# Patient Record
Sex: FEMALE | Race: WHITE | NOT HISPANIC OR LATINO | Employment: FULL TIME | URBAN - NONMETROPOLITAN AREA
[De-identification: names, ages, dates, MRNs, and addresses within clinical notes are randomized per-mention and may not be internally consistent; named-entity substitution may affect disease eponyms.]

---
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
## 2018-04-26 LAB
EXTERNAL GBS SUSCEPTIBILITY TO CLINDAMYCIN: NO
EXTERNAL GROUP B STREP ANTIGEN: POSITIVE

## 2018-05-13 ENCOUNTER — HOSPITAL ENCOUNTER (OUTPATIENT)
Facility: HOSPITAL | Age: 18
Setting detail: OBSERVATION
Discharge: HOME | End: 2018-05-14
Attending: OBSTETRICS & GYNECOLOGY | Admitting: OBSTETRICS & GYNECOLOGY

## 2018-05-13 PROBLEM — O47.9 THREATENED LABOR: Status: ACTIVE | Noted: 2018-05-13

## 2018-05-13 LAB
DEPRECATED RDW RBC AUTO: 43.4 FL (ref 40–54)
ERYTHROCYTE [DISTWIDTH] IN BLOOD BY AUTOMATED COUNT: 12.9 % (ref 12–15)
HCT VFR BLD AUTO: 34.8 % (ref 37–47)
HGB BLD-MCNC: 12.4 G/DL (ref 12–16)
MCH RBC QN AUTO: 32.9 PG (ref 28–32)
MCHC RBC AUTO-ENTMCNC: 35.6 G/DL (ref 33–36)
MCV RBC AUTO: 92.3 FL (ref 82–98)
PLATELET # BLD AUTO: 172 10*3/MM3 (ref 130–400)
PMV BLD AUTO: 12.8 FL (ref 6–12)
RBC # BLD AUTO: 3.77 10*6/MM3 (ref 4.2–5.4)
WBC NRBC COR # BLD: 11.24 10*3/MM3 (ref 4.8–10.8)

## 2018-05-13 PROCEDURE — 85027 COMPLETE CBC AUTOMATED: CPT | Performed by: OBSTETRICS & GYNECOLOGY

## 2018-05-13 PROCEDURE — 86900 BLOOD TYPING SEROLOGIC ABO: CPT | Performed by: OBSTETRICS & GYNECOLOGY

## 2018-05-13 PROCEDURE — G0378 HOSPITAL OBSERVATION PER HR: HCPCS

## 2018-05-13 PROCEDURE — 86850 RBC ANTIBODY SCREEN: CPT | Performed by: OBSTETRICS & GYNECOLOGY

## 2018-05-13 PROCEDURE — 86901 BLOOD TYPING SEROLOGIC RH(D): CPT | Performed by: OBSTETRICS & GYNECOLOGY

## 2018-05-13 RX ORDER — PRENATAL VIT NO.126/IRON/FOLIC 28MG-0.8MG
1 TABLET ORAL DAILY
COMMUNITY

## 2018-05-13 RX ORDER — SODIUM CHLORIDE, SODIUM LACTATE, POTASSIUM CHLORIDE, CALCIUM CHLORIDE 600; 310; 30; 20 MG/100ML; MG/100ML; MG/100ML; MG/100ML
125 INJECTION, SOLUTION INTRAVENOUS CONTINUOUS
Status: DISCONTINUED | OUTPATIENT
Start: 2018-05-13 | End: 2018-05-14 | Stop reason: HOSPADM

## 2018-05-13 RX ORDER — SODIUM CHLORIDE 0.9 % (FLUSH) 0.9 %
1-10 SYRINGE (ML) INJECTION AS NEEDED
Status: DISCONTINUED | OUTPATIENT
Start: 2018-05-13 | End: 2018-05-14 | Stop reason: HOSPADM

## 2018-05-13 RX ADMIN — SODIUM CHLORIDE, POTASSIUM CHLORIDE, SODIUM LACTATE AND CALCIUM CHLORIDE 1000 ML: 600; 310; 30; 20 INJECTION, SOLUTION INTRAVENOUS at 23:05

## 2018-05-14 VITALS
TEMPERATURE: 97.6 F | HEART RATE: 70 BPM | WEIGHT: 167.4 LBS | OXYGEN SATURATION: 99 % | HEIGHT: 66 IN | DIASTOLIC BLOOD PRESSURE: 83 MMHG | RESPIRATION RATE: 18 BRPM | BODY MASS INDEX: 26.9 KG/M2 | SYSTOLIC BLOOD PRESSURE: 139 MMHG

## 2018-05-14 LAB
ABO GROUP BLD: NORMAL
BLD GP AB SCN SERPL QL: NEGATIVE
RH BLD: POSITIVE
T&S EXPIRATION DATE: NORMAL

## 2018-05-14 PROCEDURE — G0378 HOSPITAL OBSERVATION PER HR: HCPCS

## 2018-05-14 PROCEDURE — G0463 HOSPITAL OUTPT CLINIC VISIT: HCPCS

## 2018-05-14 PROCEDURE — 96361 HYDRATE IV INFUSION ADD-ON: CPT

## 2018-05-14 PROCEDURE — 96360 HYDRATION IV INFUSION INIT: CPT

## 2018-05-14 RX ADMIN — SODIUM CHLORIDE, POTASSIUM CHLORIDE, SODIUM LACTATE AND CALCIUM CHLORIDE 125 ML/HR: 600; 310; 30; 20 INJECTION, SOLUTION INTRAVENOUS at 01:01

## 2018-05-15 ENCOUNTER — HOSPITAL ENCOUNTER (INPATIENT)
Facility: HOSPITAL | Age: 18
LOS: 2 days | Discharge: HOME | End: 2018-05-17
Attending: OBSTETRICS & GYNECOLOGY | Admitting: OBSTETRICS & GYNECOLOGY

## 2018-05-15 ENCOUNTER — ANESTHESIA EVENT (OUTPATIENT)
Dept: LABOR AND DELIVERY | Facility: HOSPITAL | Age: 18
End: 2018-05-15

## 2018-05-15 ENCOUNTER — ANESTHESIA (OUTPATIENT)
Dept: LABOR AND DELIVERY | Facility: HOSPITAL | Age: 18
End: 2018-05-15

## 2018-05-15 PROBLEM — Z37.9 NORMAL LABOR: Status: ACTIVE | Noted: 2018-05-15

## 2018-05-15 PROBLEM — Z37.9 NORMAL LABOR: Status: RESOLVED | Noted: 2018-05-15 | Resolved: 2018-05-15

## 2018-05-15 PROBLEM — O47.9 THREATENED LABOR: Status: RESOLVED | Noted: 2018-05-13 | Resolved: 2018-05-15

## 2018-05-15 LAB
AMPHET+METHAMPHET UR QL: NEGATIVE
BARBITURATES UR QL SCN: NEGATIVE
BENZODIAZ UR QL SCN: NEGATIVE
CANNABINOIDS SERPL QL: NEGATIVE
COCAINE UR QL: NEGATIVE
DEPRECATED RDW RBC AUTO: 42.4 FL (ref 40–54)
EOSINOPHIL # BLD MANUAL: 0.27 10*3/MM3 (ref 0–0.7)
EOSINOPHIL NFR BLD MANUAL: 2 % (ref 0–4)
ERYTHROCYTE [DISTWIDTH] IN BLOOD BY AUTOMATED COUNT: 13 % (ref 12–15)
HCT VFR BLD AUTO: 39.1 % (ref 37–47)
HGB BLD-MCNC: 13.8 G/DL (ref 12–16)
LYMPHOCYTES # BLD MANUAL: 3.73 10*3/MM3 (ref 0.72–4.86)
LYMPHOCYTES NFR BLD MANUAL: 28 % (ref 15–45)
LYMPHOCYTES NFR BLD MANUAL: 9 % (ref 4–12)
MCH RBC QN AUTO: 32.3 PG (ref 28–32)
MCHC RBC AUTO-ENTMCNC: 35.3 G/DL (ref 33–36)
MCV RBC AUTO: 91.6 FL (ref 82–98)
METHADONE UR QL SCN: NEGATIVE
MONOCYTES # BLD AUTO: 1.2 10*3/MM3 (ref 0.19–1.3)
NEUTROPHILS # BLD AUTO: 8.12 10*3/MM3 (ref 1.87–8.4)
NEUTROPHILS NFR BLD MANUAL: 61 % (ref 39–78)
OPIATES UR QL: NEGATIVE
PCP UR QL SCN: NEGATIVE
PLATELET # BLD AUTO: 213 10*3/MM3 (ref 130–400)
PMV BLD AUTO: 12.8 FL (ref 6–12)
POIKILOCYTOSIS BLD QL SMEAR: NORMAL
RBC # BLD AUTO: 4.27 10*6/MM3 (ref 4.2–5.4)
SMALL PLATELETS BLD QL SMEAR: NORMAL
WBC MORPH BLD: NORMAL
WBC NRBC COR # BLD: 13.31 10*3/MM3 (ref 4.8–10.8)

## 2018-05-15 PROCEDURE — 80307 DRUG TEST PRSMV CHEM ANLYZR: CPT | Performed by: OBSTETRICS & GYNECOLOGY

## 2018-05-15 PROCEDURE — 25010000002 PENICILLIN G POTASSIUM PER 600000 UNITS: Performed by: OBSTETRICS & GYNECOLOGY

## 2018-05-15 PROCEDURE — 36415 COLL VENOUS BLD VENIPUNCTURE: CPT | Performed by: OBSTETRICS & GYNECOLOGY

## 2018-05-15 PROCEDURE — 25010000002 TERBUTALINE PER 1 MG: Performed by: OBSTETRICS & GYNECOLOGY

## 2018-05-15 PROCEDURE — 85027 COMPLETE CBC AUTOMATED: CPT | Performed by: OBSTETRICS & GYNECOLOGY

## 2018-05-15 PROCEDURE — 51702 INSERT TEMP BLADDER CATH: CPT

## 2018-05-15 PROCEDURE — 88307 TISSUE EXAM BY PATHOLOGIST: CPT | Performed by: OBSTETRICS & GYNECOLOGY

## 2018-05-15 RX ORDER — SODIUM CHLORIDE 0.9 % (FLUSH) 0.9 %
1-10 SYRINGE (ML) INJECTION AS NEEDED
Status: DISCONTINUED | OUTPATIENT
Start: 2018-05-15 | End: 2018-05-17 | Stop reason: HOSPADM

## 2018-05-15 RX ORDER — MISOPROSTOL 200 UG/1
600 TABLET ORAL ONCE AS NEEDED
Status: DISCONTINUED | OUTPATIENT
Start: 2018-05-15 | End: 2018-05-17 | Stop reason: HOSPADM

## 2018-05-15 RX ORDER — IBUPROFEN 800 MG/1
800 TABLET ORAL EVERY 8 HOURS PRN
Status: DISCONTINUED | OUTPATIENT
Start: 2018-05-15 | End: 2018-05-15 | Stop reason: HOSPADM

## 2018-05-15 RX ORDER — BUTORPHANOL TARTRATE 1 MG/ML
1 INJECTION, SOLUTION INTRAMUSCULAR; INTRAVENOUS
Status: DISCONTINUED | OUTPATIENT
Start: 2018-05-15 | End: 2018-05-15 | Stop reason: HOSPADM

## 2018-05-15 RX ORDER — PROMETHAZINE HYDROCHLORIDE 25 MG/ML
12.5 INJECTION, SOLUTION INTRAMUSCULAR; INTRAVENOUS EVERY 6 HOURS PRN
Status: DISCONTINUED | OUTPATIENT
Start: 2018-05-15 | End: 2018-05-15 | Stop reason: HOSPADM

## 2018-05-15 RX ORDER — IBUPROFEN 800 MG/1
800 TABLET ORAL EVERY 8 HOURS PRN
Status: DISCONTINUED | OUTPATIENT
Start: 2018-05-15 | End: 2018-05-17 | Stop reason: HOSPADM

## 2018-05-15 RX ORDER — ONDANSETRON 4 MG/1
4 TABLET, FILM COATED ORAL EVERY 6 HOURS PRN
Status: DISCONTINUED | OUTPATIENT
Start: 2018-05-15 | End: 2018-05-15 | Stop reason: HOSPADM

## 2018-05-15 RX ORDER — SODIUM CHLORIDE, SODIUM LACTATE, POTASSIUM CHLORIDE, CALCIUM CHLORIDE 600; 310; 30; 20 MG/100ML; MG/100ML; MG/100ML; MG/100ML
125 INJECTION, SOLUTION INTRAVENOUS CONTINUOUS
Status: DISCONTINUED | OUTPATIENT
Start: 2018-05-15 | End: 2018-05-15

## 2018-05-15 RX ORDER — CARBOPROST TROMETHAMINE 250 UG/ML
250 INJECTION, SOLUTION INTRAMUSCULAR AS NEEDED
Status: DISCONTINUED | OUTPATIENT
Start: 2018-05-15 | End: 2018-05-15 | Stop reason: HOSPADM

## 2018-05-15 RX ORDER — MORPHINE SULFATE 2 MG/ML
2 INJECTION, SOLUTION INTRAMUSCULAR; INTRAVENOUS
Status: DISCONTINUED | OUTPATIENT
Start: 2018-05-15 | End: 2018-05-15 | Stop reason: HOSPADM

## 2018-05-15 RX ORDER — ONDANSETRON 2 MG/ML
4 INJECTION INTRAMUSCULAR; INTRAVENOUS EVERY 6 HOURS PRN
Status: DISCONTINUED | OUTPATIENT
Start: 2018-05-15 | End: 2018-05-17 | Stop reason: HOSPADM

## 2018-05-15 RX ORDER — ONDANSETRON 4 MG/1
4 TABLET, ORALLY DISINTEGRATING ORAL EVERY 6 HOURS PRN
Status: DISCONTINUED | OUTPATIENT
Start: 2018-05-15 | End: 2018-05-17 | Stop reason: HOSPADM

## 2018-05-15 RX ORDER — PENICILLIN G 3000000 [IU]/50ML
3 INJECTION, SOLUTION INTRAVENOUS EVERY 4 HOURS
Status: DISCONTINUED | OUTPATIENT
Start: 2018-05-15 | End: 2018-05-15 | Stop reason: HOSPADM

## 2018-05-15 RX ORDER — ONDANSETRON 4 MG/1
4 TABLET, FILM COATED ORAL EVERY 6 HOURS PRN
Status: DISCONTINUED | OUTPATIENT
Start: 2018-05-15 | End: 2018-05-17 | Stop reason: HOSPADM

## 2018-05-15 RX ORDER — CARBOPROST TROMETHAMINE 250 UG/ML
250 INJECTION, SOLUTION INTRAMUSCULAR ONCE AS NEEDED
Status: DISCONTINUED | OUTPATIENT
Start: 2018-05-15 | End: 2018-05-17 | Stop reason: HOSPADM

## 2018-05-15 RX ORDER — LIDOCAINE HYDROCHLORIDE 10 MG/ML
5 INJECTION, SOLUTION EPIDURAL; INFILTRATION; INTRACAUDAL; PERINEURAL AS NEEDED
Status: DISCONTINUED | OUTPATIENT
Start: 2018-05-15 | End: 2018-05-15 | Stop reason: HOSPADM

## 2018-05-15 RX ORDER — OXYCODONE HYDROCHLORIDE AND ACETAMINOPHEN 5; 325 MG/1; MG/1
1 TABLET ORAL EVERY 6 HOURS PRN
Status: DISCONTINUED | OUTPATIENT
Start: 2018-05-15 | End: 2018-05-17 | Stop reason: HOSPADM

## 2018-05-15 RX ORDER — OXYTOCIN/0.9 % SODIUM CHLORIDE 30/500 ML
2-30 PLASTIC BAG, INJECTION (ML) INTRAVENOUS
Status: DISCONTINUED | OUTPATIENT
Start: 2018-05-15 | End: 2018-05-15

## 2018-05-15 RX ORDER — PROMETHAZINE HYDROCHLORIDE 25 MG/1
12.5 TABLET ORAL EVERY 6 HOURS PRN
Status: DISCONTINUED | OUTPATIENT
Start: 2018-05-15 | End: 2018-05-15 | Stop reason: HOSPADM

## 2018-05-15 RX ORDER — PROMETHAZINE HYDROCHLORIDE 25 MG/1
25 TABLET ORAL EVERY 6 HOURS PRN
Status: DISCONTINUED | OUTPATIENT
Start: 2018-05-15 | End: 2018-05-17 | Stop reason: HOSPADM

## 2018-05-15 RX ORDER — PRENATAL VIT/IRON FUM/FOLIC AC 27MG-0.8MG
1 TABLET ORAL DAILY
Status: DISCONTINUED | OUTPATIENT
Start: 2018-05-15 | End: 2018-05-17 | Stop reason: HOSPADM

## 2018-05-15 RX ORDER — PROMETHAZINE HYDROCHLORIDE 12.5 MG/1
12.5 SUPPOSITORY RECTAL EVERY 6 HOURS PRN
Status: DISCONTINUED | OUTPATIENT
Start: 2018-05-15 | End: 2018-05-15 | Stop reason: HOSPADM

## 2018-05-15 RX ORDER — METHYLERGONOVINE MALEATE 0.2 MG/ML
200 INJECTION INTRAVENOUS ONCE AS NEEDED
Status: DISCONTINUED | OUTPATIENT
Start: 2018-05-15 | End: 2018-05-15 | Stop reason: HOSPADM

## 2018-05-15 RX ORDER — LIDOCAINE HYDROCHLORIDE 20 MG/ML
INJECTION, SOLUTION INFILTRATION; PERINEURAL
Status: COMPLETED
Start: 2018-05-15 | End: 2018-05-15

## 2018-05-15 RX ORDER — METHYLERGONOVINE MALEATE 0.2 MG/ML
200 INJECTION INTRAVENOUS ONCE AS NEEDED
Status: DISCONTINUED | OUTPATIENT
Start: 2018-05-15 | End: 2018-05-17 | Stop reason: HOSPADM

## 2018-05-15 RX ORDER — BISACODYL 10 MG
10 SUPPOSITORY, RECTAL RECTAL DAILY PRN
Status: DISCONTINUED | OUTPATIENT
Start: 2018-05-16 | End: 2018-05-17 | Stop reason: HOSPADM

## 2018-05-15 RX ORDER — ONDANSETRON 4 MG/1
4 TABLET, ORALLY DISINTEGRATING ORAL EVERY 6 HOURS PRN
Status: DISCONTINUED | OUTPATIENT
Start: 2018-05-15 | End: 2018-05-15 | Stop reason: HOSPADM

## 2018-05-15 RX ORDER — MISOPROSTOL 200 UG/1
800 TABLET ORAL AS NEEDED
Status: DISCONTINUED | OUTPATIENT
Start: 2018-05-15 | End: 2018-05-15 | Stop reason: HOSPADM

## 2018-05-15 RX ORDER — PROMETHAZINE HYDROCHLORIDE 12.5 MG/1
12.5 SUPPOSITORY RECTAL EVERY 6 HOURS PRN
Status: DISCONTINUED | OUTPATIENT
Start: 2018-05-15 | End: 2018-05-17 | Stop reason: HOSPADM

## 2018-05-15 RX ORDER — ACETAMINOPHEN 325 MG/1
650 TABLET ORAL EVERY 4 HOURS PRN
Status: DISCONTINUED | OUTPATIENT
Start: 2018-05-15 | End: 2018-05-15 | Stop reason: HOSPADM

## 2018-05-15 RX ORDER — OXYTOCIN/RINGER'S LACTATE 20/1000 ML
125 PLASTIC BAG, INJECTION (ML) INTRAVENOUS AS NEEDED
Status: DISCONTINUED | OUTPATIENT
Start: 2018-05-15 | End: 2018-05-15 | Stop reason: HOSPADM

## 2018-05-15 RX ORDER — OXYTOCIN/RINGER'S LACTATE 20/1000 ML
999 PLASTIC BAG, INJECTION (ML) INTRAVENOUS ONCE
Status: COMPLETED | OUTPATIENT
Start: 2018-05-15 | End: 2018-05-15

## 2018-05-15 RX ORDER — ONDANSETRON 2 MG/ML
4 INJECTION INTRAMUSCULAR; INTRAVENOUS EVERY 6 HOURS PRN
Status: DISCONTINUED | OUTPATIENT
Start: 2018-05-15 | End: 2018-05-15 | Stop reason: HOSPADM

## 2018-05-15 RX ORDER — SODIUM CHLORIDE 0.9 % (FLUSH) 0.9 %
1-10 SYRINGE (ML) INJECTION AS NEEDED
Status: DISCONTINUED | OUTPATIENT
Start: 2018-05-15 | End: 2018-05-15 | Stop reason: HOSPADM

## 2018-05-15 RX ORDER — PROMETHAZINE HYDROCHLORIDE 25 MG/ML
12.5 INJECTION, SOLUTION INTRAMUSCULAR; INTRAVENOUS EVERY 6 HOURS PRN
Status: DISCONTINUED | OUTPATIENT
Start: 2018-05-15 | End: 2018-05-17 | Stop reason: HOSPADM

## 2018-05-15 RX ORDER — TERBUTALINE SULFATE 1 MG/ML
0.25 INJECTION, SOLUTION SUBCUTANEOUS AS NEEDED
Status: DISCONTINUED | OUTPATIENT
Start: 2018-05-15 | End: 2018-05-15 | Stop reason: HOSPADM

## 2018-05-15 RX ADMIN — IBUPROFEN 800 MG: 800 TABLET ORAL at 21:26

## 2018-05-15 RX ADMIN — PRENATAL VIT W/ FE FUMARATE-FA TAB 27-0.8 MG 1 TABLET: 27-0.8 TAB at 15:03

## 2018-05-15 RX ADMIN — IBUPROFEN 800 MG: 800 TABLET ORAL at 12:35

## 2018-05-15 RX ADMIN — TERBUTALINE SULFATE 0.25 MG: 1 INJECTION, SOLUTION SUBCUTANEOUS at 09:59

## 2018-05-15 RX ADMIN — Medication 2 G: at 10:13

## 2018-05-15 RX ADMIN — SODIUM CHLORIDE 5 MILLION UNITS: 9 INJECTION, SOLUTION INTRAVENOUS at 09:52

## 2018-05-15 RX ADMIN — LIDOCAINE HYDROCHLORIDE 20 ML: 20 INJECTION, SOLUTION INFILTRATION; PERINEURAL at 11:47

## 2018-05-15 RX ADMIN — SODIUM CHLORIDE, POTASSIUM CHLORIDE, SODIUM LACTATE AND CALCIUM CHLORIDE 1000 ML: 600; 310; 30; 20 INJECTION, SOLUTION INTRAVENOUS at 09:40

## 2018-05-15 RX ADMIN — SODIUM CHLORIDE, POTASSIUM CHLORIDE, SODIUM LACTATE AND CALCIUM CHLORIDE 999 ML/HR: 600; 310; 30; 20 INJECTION, SOLUTION INTRAVENOUS at 10:35

## 2018-05-15 RX ADMIN — OXYTOCIN 999 ML/HR: 10 INJECTION INTRAVENOUS at 11:52

## 2018-05-15 RX ADMIN — OXYTOCIN-SODIUM CHLORIDE 0.9% IV SOLN 30 UNIT/500ML 2 MILLI-UNITS/MIN: 30-0.9/5 SOLUTION at 11:36

## 2018-05-15 NOTE — L&D DELIVERY NOTE
[facility]  Vaginal Delivery Note    Delivery     Delivery: Vaginal, Spontaneous Delivery     YOB: 2020    Time of Birth: 11:50 AM      Anesthesia: Local     Delivering clinician: Analy Olson    Forceps?   No   Vacuum? No    Shoulder dystocia present: No        Delivery narrative:      Infant    Findings: female  infant     Infant observations: Weight: 2770 g (6 lb 1.7 oz)   Length: 18.5  in  Observations/Comments:  33 cm- head      Apgars: 8   @ 1 minute /    9   @ 5 minutes   Infant Name:      Placenta, Cord, and Fluid    Placenta delivered  Spontaneous  at   5/15 11:52 AM     Cord: 3 vessels  present.   Nuchal Cord?  no   Cord blood obtained: Yes    Cord gases obtained:  No    Cord gas results: Venous:  No results found for: PHCVEN    Arterial:  No results found for: PHCART     Repair    Episiotomy: None    Lacerations: No   Estimated Blood Loss: Est. Blood Loss (mL): 200 mL (Filed from Delivery Summary) (05/15/18 1150)           Complications  none    Disposition  Mother to Mother Baby/Postpartum  in stable condition currently.  Baby to remains with mom  in stable condition currently.      Analy Olson MD  18  1:19 PM

## 2018-05-15 NOTE — LACTATION NOTE
This note was copied from a baby's chart.  38 week female infant, Mary, delivered vaginally 5/15/18 at 1150. Birth weight 6-1.7 (2770g). Assisted with first feeding in LDR. Mother's nipples somewhat flat with stimulation. Hand expressed several drops of colostrum into infant's mouth. Several unsuccessful attempts made to latch infant. Infant latched after placing small nipple shield. Deep jaw dropping sucks/swallows observed. Colostrum seen in shield. Education provided regarding nipple shield (use, cleaning, weaning), breast massage/hand expression, milk supply, tummy size, and skin to skin. Gave and reviewed initial breastfeeding packet. Encouraged frequent feedings (try without shield first, use if needed), with breast compressions, skin to skin with infant. Mother verbalized understanding. Questions denied.     Maternal Hx: , UTI  Medications: PNV  Pump: Manual, Rx faxed

## 2018-05-15 NOTE — PLAN OF CARE
Problem: Patient Care Overview  Goal: Plan of Care Review  Outcome: Ongoing (interventions implemented as appropriate)   05/15/18 1604   Coping/Psychosocial   Plan of Care Reviewed With patient   Plan of Care Review   Progress improving   OTHER   Outcome Summary VSS during shift. voiding and ambulating well. fundus firm midline U1 with light-scant bleeding. want tdap. GBS + treated x1.      Goal: Individualization and Mutuality  Outcome: Ongoing (interventions implemented as appropriate)   05/15/18 1604   Individualization   Patient Specific Preferences breastfeed infant. pain medication as requested   Patient Specific Goals (Include Timeframe) pain control   Patient Specific Interventions encourage ambulation   Mutuality/Individual Preferences   What Anxieties, Fears, Concerns, or Questions Do You Have About Your Care? none      Goal: Discharge Needs Assessment  Outcome: Ongoing (interventions implemented as appropriate)      Problem: Postpartum (Vaginal Delivery) (Adult,Obstetrics,Pediatric)  Goal: Signs and Symptoms of Listed Potential Problems Will be Absent, Minimized or Managed (Postpartum)  Outcome: Ongoing (interventions implemented as appropriate)

## 2018-05-16 LAB
HCT VFR BLD AUTO: 31.2 % (ref 37–47)
HGB BLD-MCNC: 10.7 G/DL (ref 12–16)

## 2018-05-16 PROCEDURE — 85018 HEMOGLOBIN: CPT | Performed by: OBSTETRICS & GYNECOLOGY

## 2018-05-16 PROCEDURE — 90715 TDAP VACCINE 7 YRS/> IM: CPT | Performed by: OBSTETRICS & GYNECOLOGY

## 2018-05-16 PROCEDURE — 85014 HEMATOCRIT: CPT | Performed by: OBSTETRICS & GYNECOLOGY

## 2018-05-16 PROCEDURE — 25010000002 TDAP 5-2.5-18.5 LF-MCG/0.5 SUSPENSION: Performed by: OBSTETRICS & GYNECOLOGY

## 2018-05-16 PROCEDURE — 90471 IMMUNIZATION ADMIN: CPT | Performed by: OBSTETRICS & GYNECOLOGY

## 2018-05-16 RX ADMIN — OXYCODONE HYDROCHLORIDE AND ACETAMINOPHEN 1 TABLET: 5; 325 TABLET ORAL at 21:26

## 2018-05-16 RX ADMIN — IBUPROFEN 800 MG: 800 TABLET ORAL at 11:43

## 2018-05-16 RX ADMIN — OXYCODONE HYDROCHLORIDE AND ACETAMINOPHEN 1 TABLET: 5; 325 TABLET ORAL at 11:43

## 2018-05-16 RX ADMIN — TETANUS TOXOID, REDUCED DIPHTHERIA TOXOID AND ACELLULAR PERTUSSIS VACCINE, ADSORBED 0.5 ML: 5; 2.5; 8; 8; 2.5 SUSPENSION INTRAMUSCULAR at 18:05

## 2018-05-16 RX ADMIN — IBUPROFEN 800 MG: 800 TABLET ORAL at 21:26

## 2018-05-16 RX ADMIN — PRENATAL VIT W/ FE FUMARATE-FA TAB 27-0.8 MG 1 TABLET: 27-0.8 TAB at 18:05

## 2018-05-16 RX ADMIN — OXYCODONE HYDROCHLORIDE AND ACETAMINOPHEN 1 TABLET: 5; 325 TABLET ORAL at 01:40

## 2018-05-16 NOTE — PLAN OF CARE
Problem: Patient Care Overview  Goal: Plan of Care Review  Outcome: Ongoing (interventions implemented as appropriate)   05/16/18 0626   Coping/Psychosocial   Plan of Care Reviewed With patient;significant other   Plan of Care Review   Progress improving   OTHER   Outcome Summary vss; voidng and ambulating, ff ml u1 with scant to light bleedings, wants tdap gbs positive with tx x1; pumping after feedings     Goal: Individualization and Mutuality  Outcome: Outcome(s) achieved Date Met: 05/16/18   05/15/18 1604 05/16/18 0626   Individualization   Patient Specific Preferences breastfeed infant. pain medication as requested --    Patient Specific Goals (Include Timeframe) pain control --    Patient Specific Interventions encourage ambulation --    Mutuality/Individual Preferences   What Anxieties, Fears, Concerns, or Questions Do You Have About Your Care? none  --    What Information Would Help Us Give You More Personalized Care? --  Infant's name is Mary   How Would You and/or Your Support Person Like to Participate in Your Care? --  stay updated on plan of care   Mutuality/Individual Preferences   How to Address Anxieties/Fears --  educate patient    05/15/18 1604       breastfeed infant. pain medication as requested    pain control    encourage ambulation       none        Problem: Postpartum (Vaginal Delivery) (Adult,Obstetrics,Pediatric)  Goal: Signs and Symptoms of Listed Potential Problems Will be Absent, Minimized or Managed (Postpartum)  Outcome: Ongoing (interventions implemented as appropriate)   05/16/18 0626   Goal/Outcome Evaluation   Problems Assessed (Postpartum Vaginal Delivery) all   Problems Present (Postpartum Vag Deliv) none       Problem: Breastfeeding (Adult,Obstetrics,Pediatric)  Goal: Signs and Symptoms of Listed Potential Problems Will be Absent, Minimized or Managed (Breastfeeding)  Outcome: Ongoing (interventions implemented as appropriate)   05/16/18 0626   Goal/Outcome Evaluation    Problems Assessed (Breastfeeding) all   Problems Present (Breastfeeding) none

## 2018-05-16 NOTE — LACTATION NOTE
This note was copied from a baby's chart.  38 week female infant, Mary, delivered vaginally 5/15/18 at 1150. Birth weight 6-1.7 (2770g). Current weight 6-0.7 (2470g). Weight loss -1.1%. Charted are 2 voids, 1 stool, and 5 breastfeeding sessions. Called to room by THAO Sellers to assist with latch. Infant latched upon visit. Deep jaw drops observed. Swallows heard. Mother states she pumped once last night, collecting 2 ml. She states she pumped because she felt Mary was still hungry after breastfeeding. Nipples slightly tender. Discussed nipple care, maternal nutrition/fluid intake, breaking suction before detaching infant, pumping, and adequate feedings/voids/stools for infant. Gave and reviewed breastfeeding book. Encouragement and support provided. Mother verbalized understanding. Questions denied.     Maternal Hx: , UTI  Medications: PNV  Pump: Manual, Rx faxed

## 2018-05-16 NOTE — PLAN OF CARE
Problem: Patient Care Overview  Goal: Plan of Care Review  Outcome: Ongoing (interventions implemented as appropriate)   05/16/18 1850   Coping/Psychosocial   Plan of Care Reviewed With patient   Plan of Care Review   Progress improving   OTHER   Outcome Summary vss, breastfeeing improving, ff/u1/ml, lochia scant, TDap given     Goal: Discharge Needs Assessment  Outcome: Ongoing (interventions implemented as appropriate)      Problem: Postpartum (Vaginal Delivery) (Adult,Obstetrics,Pediatric)  Goal: Signs and Symptoms of Listed Potential Problems Will be Absent, Minimized or Managed (Postpartum)  Outcome: Ongoing (interventions implemented as appropriate)   05/16/18 1850   Goal/Outcome Evaluation   Problems Assessed (Postpartum Vaginal Delivery) all   Problems Present (Postpartum Vag Deliv) none       Problem: Breastfeeding (Adult,Obstetrics,Pediatric)  Goal: Signs and Symptoms of Listed Potential Problems Will be Absent, Minimized or Managed (Breastfeeding)  Outcome: Ongoing (interventions implemented as appropriate)   05/16/18 1850   Goal/Outcome Evaluation   Problems Assessed (Breastfeeding) all   Problems Present (Breastfeeding) none

## 2018-05-16 NOTE — PROGRESS NOTES
"[facility]  Vaginal Delivery Progress Note    Subjective   Postpartum Day 1: Vaginal Delivery    The patient feels well.  Her pain is well controlled with prescribed pain medications.   She is ambulating well.  Patient describes her bleeding as moderate lochia.    Breastfeeding: declines.    Objective     Vital Signs Range for the last 24 hours  Temperature: Temp:  [97.3 °F (36.3 °C)-99 °F (37.2 °C)] 97.3 °F (36.3 °C)   Temp Source: Temp src: Temporal Artery    BP: BP: (115-137)/(57-77) 115/62   Pulse: Heart Rate:  [70-87] 74   Respirations: Resp:  [16-18] 16   SPO2: SpO2:  [96 %-100 %] 96 %   O2 Amount (l/min):     O2 Devices Device (Oxygen Therapy): room air   Weight:       Admit Height:  Height: 167.6 cm (66\")      Physical Exam:  General:  no acute distresss.  Abdomen: Fundus: appropriate, firm, non tender  Extremities: normal, atraumatic, no cyanosis, and trace edema.     Lab results reviewed:  Yes   Rubella:  No results found for: RUBELLAIGGIN Nurse Transcribed from prenatal record --  No components found for: EXTRUBELQUAL  Rh Status:    RH type   Date Value Ref Range Status   05/13/2018 Positive  Final     Immunizations: There is no immunization history for the selected administration types on file for this patient.    Assessment/Plan     Active Problems:    * No active hospital problems. *      Yuly Quinn is Day 1  post-partum  Vaginal, Spontaneous Delivery    .      Plan:  Continue current care.      Analy Olson MD  5/16/2018  1:19 PM  "

## 2018-05-17 VITALS
HEIGHT: 66 IN | WEIGHT: 167 LBS | SYSTOLIC BLOOD PRESSURE: 132 MMHG | TEMPERATURE: 98 F | DIASTOLIC BLOOD PRESSURE: 82 MMHG | OXYGEN SATURATION: 98 % | HEART RATE: 89 BPM | BODY MASS INDEX: 26.84 KG/M2 | RESPIRATION RATE: 18 BRPM

## 2018-05-17 LAB
CYTO UR: NORMAL
LAB AP CASE REPORT: NORMAL
LAB AP CLINICAL INFORMATION: NORMAL
Lab: NORMAL
PATH REPORT.FINAL DX SPEC: NORMAL
PATH REPORT.GROSS SPEC: NORMAL

## 2018-05-17 RX ORDER — OXYCODONE HYDROCHLORIDE AND ACETAMINOPHEN 5; 325 MG/1; MG/1
1 TABLET ORAL EVERY 6 HOURS PRN
Qty: 10 TABLET | Refills: 0 | Status: SHIPPED | OUTPATIENT
Start: 2018-05-17 | End: 2018-05-25

## 2018-05-17 RX ORDER — IBUPROFEN 800 MG/1
800 TABLET ORAL EVERY 8 HOURS PRN
Qty: 90 TABLET | Refills: 2 | Status: ON HOLD | OUTPATIENT
Start: 2018-05-17 | End: 2020-09-30

## 2018-05-17 RX ADMIN — IBUPROFEN 800 MG: 800 TABLET ORAL at 06:43

## 2018-05-17 RX ADMIN — OXYCODONE HYDROCHLORIDE AND ACETAMINOPHEN 1 TABLET: 5; 325 TABLET ORAL at 06:43

## 2018-05-17 NOTE — PROGRESS NOTES
"[facility]  Vaginal Delivery Progress Note    Subjective   Postpartum Day 2: Vaginal Delivery    The patient feels well.  Her pain is well controlled with prescribed pain medications.   She is ambulating well.  Patient describes her bleeding as thin lochia.    Breastfeeding: declines.    Objective     Vital Signs Range for the last 24 hours  Temperature: Temp:  [97.3 °F (36.3 °C)-97.9 °F (36.6 °C)] 97.9 °F (36.6 °C)   Temp Source: Temp src: Temporal Artery    BP: BP: (115-137)/(62-83) 137/83   Pulse: Heart Rate:  [74-91] 91   Respirations: Resp:  [16-20] 20   SPO2: SpO2:  [96 %-97 %] 97 %   O2 Amount (l/min):     O2 Devices Device (Oxygen Therapy): room air   Weight:       Admit Height:  Height: 167.6 cm (66\")      Physical Exam:  General:  no acute distresss.  Abdomen: Fundus: appropriate, firm, non tender  Extremities: normal, atraumatic, no cyanosis, and trace edema.     Lab results reviewed:  Yes   Rubella:  No results found for: RUBELLAIGGIN Nurse Transcribed from prenatal record --  No components found for: EXTRUBELQUAL  Rh Status:  No results found for: RH  Immunizations:   Immunization History   Administered Date(s) Administered   • Tdap 05/16/2018       Assessment/Plan     Active Problems:    * No active hospital problems. *      Yuly Quinn is Day 2  post-partum  Vaginal, Spontaneous Delivery    .      Plan:  Discharge home with standard precautions and return to clinic in 4-6 weeks.      Analy Olson MD  5/17/2018  7:57 AM  "

## 2018-05-17 NOTE — PLAN OF CARE
Problem: Patient Care Overview  Goal: Plan of Care Review  Outcome: Ongoing (interventions implemented as appropriate)   05/17/18 9844   Coping/Psychosocial   Plan of Care Reviewed With patient   Plan of Care Review   Progress improving   OTHER   Outcome Summary vitals stable, fundus and lochia wnl, pain controlled with po pain meds, voiding, ambuating in room, rested well with family at bedside     Goal: Individualization and Mutuality  Outcome: Ongoing (interventions implemented as appropriate)

## 2018-05-17 NOTE — DISCHARGE SUMMARY
Discharge Summary     Jarred Quinn  : 2000  MRN: [id number]  CSN: [id number]    Date of Admission: 5/15/2018   Date of Discharge:  2018   Delivering Physician: Analy Olson        Admission Diagnosis: 1. Normal labor [O80, Z37.9]  2. Normal delivery [O80, Z37.9]   Discharge Diagnosis: 1. Pregnancy at 38w0d - delivered       Procedures: 5/15/2018  - Vaginal, Spontaneous Delivery       Hospital Course  Patient is a 18 y.o.  who at 38w0d had a uncomplicated vaginal delivery.  Her postpartum course was without complications.  On PPD #2 she was ready for discharge.  She had normal lochia and pain was well controlled with oral medications.    Infant  female  fetus weighing 2770 g (6 lb 1.7 oz)   Apgars -  8  @ 1 minute /  9  @ 5 minutes.    Discharge labs  Lab Results   Component Value Date    WBC 13.31 (H) 05/15/2018    HGB 10.7 (L) 2018    HCT 31.2 (L) 2018     05/15/2018       Discharge Medications   Yuly Quinn   Home Medication Instructions CARLITO:[id number]    Printed on:18 0759   Medication Information                      ibuprofen (ADVIL,MOTRIN) 800 MG tablet  Take 1 tablet by mouth Every 8 (Eight) Hours As Needed for Mild Pain .             oxyCODONE-acetaminophen (PERCOCET) 5-325 MG per tablet  Take 1 tablet by mouth Every 6 (Six) Hours As Needed for Moderate Pain  or Severe Pain  for up to 8 days.             Prenatal Vit-Fe Fumarate-FA (PRENATAL, CLASSIC, VITAMIN) 28-0.8 MG tablet tablet  Take 1 tablet by mouth Daily.                 Discharge Disposition Home or Self Care   Condition on Discharge: good   Follow-up: 4 weeks with Dr. Olson, if no appointment, please call office     Analy Olson MD  2018

## 2018-05-17 NOTE — LACTATION NOTE
This note was copied from a baby's chart.  38 week female infant, Mary, delivered vaginally 5/15/18 at 1150. Birth weight 6-1.7 (2770g). Weight loss -5%. Charted are 3 voids, 3 stool, 1 formula feed and 6 breastfeeding sessions. Mother concerned infant is not getting enough and states she plans to supplement with formula. Discussed expected weight loss of -7% in the first 3-5 days, expected voids/stools for day 2, feeding plan, supplementing, outpatient lactation clinic and support, and reviewed breastfeeding book.       Maternal Hx: , UTI  Medications: PNV  Pump: Manual, Medela Pump N Style

## 2019-08-07 ENCOUNTER — OFFICE VISIT (OUTPATIENT)
Dept: URGENT CARE | Age: 19
End: 2019-08-07
Payer: MEDICAID

## 2019-08-07 VITALS
RESPIRATION RATE: 16 BRPM | SYSTOLIC BLOOD PRESSURE: 124 MMHG | OXYGEN SATURATION: 98 % | HEART RATE: 85 BPM | BODY MASS INDEX: 20.57 KG/M2 | HEIGHT: 66 IN | TEMPERATURE: 98.6 F | DIASTOLIC BLOOD PRESSURE: 74 MMHG | WEIGHT: 128 LBS

## 2019-08-07 DIAGNOSIS — K12.1 ULCER OF SOFT PALATE: ICD-10-CM

## 2019-08-07 DIAGNOSIS — J02.9 SORE THROAT: Primary | ICD-10-CM

## 2019-08-07 LAB — S PYO AG THROAT QL: NORMAL

## 2019-08-07 PROCEDURE — 87880 STREP A ASSAY W/OPTIC: CPT | Performed by: NURSE PRACTITIONER

## 2019-08-07 PROCEDURE — 99213 OFFICE O/P EST LOW 20 MIN: CPT | Performed by: NURSE PRACTITIONER

## 2019-08-07 RX ORDER — NORETHINDRONE ACETATE AND ETHINYL ESTRADIOL AND FERROUS FUMARATE 1MG-20(21)
KIT ORAL
COMMUNITY
Start: 2019-08-03

## 2019-08-07 SDOH — HEALTH STABILITY: MENTAL HEALTH: HOW OFTEN DO YOU HAVE A DRINK CONTAINING ALCOHOL?: NEVER

## 2019-12-28 ENCOUNTER — OFFICE VISIT (OUTPATIENT)
Dept: URGENT CARE | Age: 19
End: 2019-12-28
Payer: MEDICAID

## 2019-12-28 VITALS
TEMPERATURE: 97.8 F | SYSTOLIC BLOOD PRESSURE: 118 MMHG | BODY MASS INDEX: 20.98 KG/M2 | RESPIRATION RATE: 18 BRPM | WEIGHT: 130 LBS | DIASTOLIC BLOOD PRESSURE: 70 MMHG | OXYGEN SATURATION: 100 % | HEART RATE: 80 BPM

## 2019-12-28 DIAGNOSIS — Z20.818 EXPOSURE TO STREP THROAT: ICD-10-CM

## 2019-12-28 DIAGNOSIS — J02.9 SORE THROAT: Primary | ICD-10-CM

## 2019-12-28 LAB — S PYO AG THROAT QL: NORMAL

## 2019-12-28 PROCEDURE — 99213 OFFICE O/P EST LOW 20 MIN: CPT | Performed by: NURSE PRACTITIONER

## 2019-12-28 PROCEDURE — 87880 STREP A ASSAY W/OPTIC: CPT | Performed by: NURSE PRACTITIONER

## 2019-12-28 RX ORDER — AMOXICILLIN 500 MG/1
500 CAPSULE ORAL 2 TIMES DAILY
Qty: 20 CAPSULE | Refills: 0 | Status: SHIPPED | OUTPATIENT
Start: 2019-12-28 | End: 2020-01-07

## 2019-12-28 RX ORDER — AMOXICILLIN 500 MG/1
500 CAPSULE ORAL 2 TIMES DAILY
Qty: 20 CAPSULE | Refills: 0 | Status: SHIPPED | OUTPATIENT
Start: 2019-12-28 | End: 2019-12-28

## 2019-12-28 ASSESSMENT — ENCOUNTER SYMPTOMS
COUGH: 1
SORE THROAT: 1

## 2020-09-30 ENCOUNTER — ANESTHESIA (OUTPATIENT)
Dept: LABOR AND DELIVERY | Facility: HOSPITAL | Age: 20
End: 2020-09-30

## 2020-09-30 ENCOUNTER — ANESTHESIA EVENT (OUTPATIENT)
Dept: LABOR AND DELIVERY | Facility: HOSPITAL | Age: 20
End: 2020-09-30

## 2020-09-30 ENCOUNTER — HOSPITAL ENCOUNTER (INPATIENT)
Facility: HOSPITAL | Age: 20
LOS: 2 days | Discharge: HOME | End: 2020-10-02
Attending: OBSTETRICS & GYNECOLOGY | Admitting: OBSTETRICS & GYNECOLOGY

## 2020-09-30 ENCOUNTER — HOSPITAL ENCOUNTER (OUTPATIENT)
Dept: LABOR AND DELIVERY | Facility: HOSPITAL | Age: 20
Discharge: HOME | End: 2020-09-30

## 2020-09-30 LAB
ABO GROUP BLD: NORMAL
BASOPHILS # BLD AUTO: 0.04 10*3/MM3 (ref 0–0.2)
BASOPHILS NFR BLD AUTO: 0.3 % (ref 0–1.5)
BLD GP AB SCN SERPL QL: NEGATIVE
DEPRECATED RDW RBC AUTO: 41.1 FL (ref 37–54)
EOSINOPHIL # BLD AUTO: 0.09 10*3/MM3 (ref 0–0.4)
EOSINOPHIL NFR BLD AUTO: 0.6 % (ref 0.3–6.2)
ERYTHROCYTE [DISTWIDTH] IN BLOOD BY AUTOMATED COUNT: 12.6 % (ref 12.3–15.4)
HCT VFR BLD AUTO: 35.6 % (ref 34–46.6)
HGB BLD-MCNC: 12.2 G/DL (ref 12–15.9)
IMM GRANULOCYTES # BLD AUTO: 0.05 10*3/MM3 (ref 0–0.05)
IMM GRANULOCYTES NFR BLD AUTO: 0.3 % (ref 0–0.5)
LYMPHOCYTES # BLD AUTO: 3.02 10*3/MM3 (ref 0.7–3.1)
LYMPHOCYTES NFR BLD AUTO: 20.8 % (ref 19.6–45.3)
MCH RBC QN AUTO: 30.3 PG (ref 26.6–33)
MCHC RBC AUTO-ENTMCNC: 34.3 G/DL (ref 31.5–35.7)
MCV RBC AUTO: 88.6 FL (ref 79–97)
MONOCYTES # BLD AUTO: 1.35 10*3/MM3 (ref 0.1–0.9)
MONOCYTES NFR BLD AUTO: 9.3 % (ref 5–12)
NEUTROPHILS NFR BLD AUTO: 68.7 % (ref 42.7–76)
NEUTROPHILS NFR BLD AUTO: 9.94 10*3/MM3 (ref 1.7–7)
NRBC BLD AUTO-RTO: 0 /100 WBC (ref 0–0.2)
PLATELET # BLD AUTO: 191 10*3/MM3 (ref 140–450)
PMV BLD AUTO: 12.7 FL (ref 6–12)
RBC # BLD AUTO: 4.02 10*6/MM3 (ref 3.77–5.28)
RH BLD: POSITIVE
T&S EXPIRATION DATE: NORMAL
WBC # BLD AUTO: 14.49 10*3/MM3 (ref 3.4–10.8)

## 2020-09-30 PROCEDURE — 86901 BLOOD TYPING SEROLOGIC RH(D): CPT | Performed by: OBSTETRICS & GYNECOLOGY

## 2020-09-30 PROCEDURE — 25010000002 ONDANSETRON PER 1 MG: Performed by: OBSTETRICS & GYNECOLOGY

## 2020-09-30 PROCEDURE — 85025 COMPLETE CBC W/AUTO DIFF WBC: CPT | Performed by: OBSTETRICS & GYNECOLOGY

## 2020-09-30 PROCEDURE — 86850 RBC ANTIBODY SCREEN: CPT | Performed by: OBSTETRICS & GYNECOLOGY

## 2020-09-30 PROCEDURE — 86900 BLOOD TYPING SEROLOGIC ABO: CPT | Performed by: OBSTETRICS & GYNECOLOGY

## 2020-09-30 PROCEDURE — 25010000002 ROPIVACAINE PER 1 MG: Performed by: NURSE ANESTHETIST, CERTIFIED REGISTERED

## 2020-09-30 PROCEDURE — C1755 CATHETER, INTRASPINAL: HCPCS | Performed by: NURSE ANESTHETIST, CERTIFIED REGISTERED

## 2020-09-30 PROCEDURE — 88307 TISSUE EXAM BY PATHOLOGIST: CPT | Performed by: OBSTETRICS & GYNECOLOGY

## 2020-09-30 RX ORDER — PROMETHAZINE HYDROCHLORIDE 12.5 MG/1
12.5 SUPPOSITORY RECTAL EVERY 6 HOURS PRN
Status: DISCONTINUED | OUTPATIENT
Start: 2020-09-30 | End: 2020-10-02 | Stop reason: HOSPADM

## 2020-09-30 RX ORDER — ONDANSETRON 4 MG/1
4 TABLET, FILM COATED ORAL EVERY 6 HOURS PRN
Status: DISCONTINUED | OUTPATIENT
Start: 2020-09-30 | End: 2020-10-02 | Stop reason: HOSPADM

## 2020-09-30 RX ORDER — OXYTOCIN/0.9 % SODIUM CHLORIDE 30/500 ML
125 PLASTIC BAG, INJECTION (ML) INTRAVENOUS CONTINUOUS PRN
Status: DISCONTINUED | OUTPATIENT
Start: 2020-09-30 | End: 2020-10-02 | Stop reason: HOSPADM

## 2020-09-30 RX ORDER — DOCUSATE SODIUM 100 MG/1
100 CAPSULE, LIQUID FILLED ORAL 2 TIMES DAILY
Status: DISCONTINUED | OUTPATIENT
Start: 2020-09-30 | End: 2020-10-02 | Stop reason: HOSPADM

## 2020-09-30 RX ORDER — SODIUM CHLORIDE 0.9 % (FLUSH) 0.9 %
1-10 SYRINGE (ML) INJECTION AS NEEDED
Status: DISCONTINUED | OUTPATIENT
Start: 2020-09-30 | End: 2020-10-02 | Stop reason: HOSPADM

## 2020-09-30 RX ORDER — ONDANSETRON 4 MG/1
4 TABLET, FILM COATED ORAL EVERY 6 HOURS PRN
Status: DISCONTINUED | OUTPATIENT
Start: 2020-09-30 | End: 2020-09-30 | Stop reason: HOSPADM

## 2020-09-30 RX ORDER — ONDANSETRON 4 MG/1
4 TABLET, FILM COATED ORAL EVERY 8 HOURS PRN
COMMUNITY

## 2020-09-30 RX ORDER — ROPIVACAINE HYDROCHLORIDE 5 MG/ML
INJECTION, SOLUTION EPIDURAL; INFILTRATION; PERINEURAL AS NEEDED
Status: DISCONTINUED | OUTPATIENT
Start: 2020-09-30 | End: 2020-10-01 | Stop reason: SURG

## 2020-09-30 RX ORDER — PROMETHAZINE HYDROCHLORIDE 25 MG/1
25 TABLET ORAL EVERY 6 HOURS PRN
Status: DISCONTINUED | OUTPATIENT
Start: 2020-09-30 | End: 2020-10-02 | Stop reason: HOSPADM

## 2020-09-30 RX ORDER — SUFENTANIL CITRATE 50 UG/ML
INJECTION EPIDURAL; INTRAVENOUS AS NEEDED
Status: DISCONTINUED | OUTPATIENT
Start: 2020-09-30 | End: 2020-10-01 | Stop reason: SURG

## 2020-09-30 RX ORDER — FAMOTIDINE 10 MG/ML
20 INJECTION, SOLUTION INTRAVENOUS ONCE AS NEEDED
Status: CANCELLED | OUTPATIENT
Start: 2020-09-30

## 2020-09-30 RX ORDER — OXYTOCIN/0.9 % SODIUM CHLORIDE 30/500 ML
999 PLASTIC BAG, INJECTION (ML) INTRAVENOUS ONCE
Status: COMPLETED | OUTPATIENT
Start: 2020-09-30 | End: 2020-09-30

## 2020-09-30 RX ORDER — SODIUM CHLORIDE 0.9 % (FLUSH) 0.9 %
10 SYRINGE (ML) INJECTION AS NEEDED
Status: DISCONTINUED | OUTPATIENT
Start: 2020-09-30 | End: 2020-09-30 | Stop reason: HOSPADM

## 2020-09-30 RX ORDER — OXYTOCIN/0.9 % SODIUM CHLORIDE 30/500 ML
999 PLASTIC BAG, INJECTION (ML) INTRAVENOUS ONCE
Status: DISCONTINUED | OUTPATIENT
Start: 2020-09-30 | End: 2020-10-02 | Stop reason: HOSPADM

## 2020-09-30 RX ORDER — ACETAMINOPHEN 325 MG/1
650 TABLET ORAL EVERY 4 HOURS PRN
Status: DISCONTINUED | OUTPATIENT
Start: 2020-09-30 | End: 2020-09-30 | Stop reason: HOSPADM

## 2020-09-30 RX ORDER — LIDOCAINE HYDROCHLORIDE 20 MG/ML
20 INJECTION, SOLUTION INFILTRATION; PERINEURAL ONCE
Status: DISCONTINUED | OUTPATIENT
Start: 2020-09-30 | End: 2020-09-30

## 2020-09-30 RX ORDER — SODIUM CHLORIDE 0.9 % (FLUSH) 0.9 %
3 SYRINGE (ML) INJECTION EVERY 12 HOURS SCHEDULED
Status: DISCONTINUED | OUTPATIENT
Start: 2020-09-30 | End: 2020-09-30 | Stop reason: HOSPADM

## 2020-09-30 RX ORDER — IBUPROFEN 800 MG/1
800 TABLET ORAL EVERY 8 HOURS PRN
Status: DISCONTINUED | OUTPATIENT
Start: 2020-09-30 | End: 2020-09-30 | Stop reason: HOSPADM

## 2020-09-30 RX ORDER — OXYCODONE HYDROCHLORIDE AND ACETAMINOPHEN 5; 325 MG/1; MG/1
1 TABLET ORAL EVERY 6 HOURS PRN
Status: DISCONTINUED | OUTPATIENT
Start: 2020-09-30 | End: 2020-10-02 | Stop reason: HOSPADM

## 2020-09-30 RX ORDER — ONDANSETRON 2 MG/ML
4 INJECTION INTRAMUSCULAR; INTRAVENOUS EVERY 6 HOURS PRN
Status: DISCONTINUED | OUTPATIENT
Start: 2020-09-30 | End: 2020-10-02 | Stop reason: HOSPADM

## 2020-09-30 RX ORDER — BISACODYL 10 MG
10 SUPPOSITORY, RECTAL RECTAL DAILY PRN
Status: DISCONTINUED | OUTPATIENT
Start: 2020-10-01 | End: 2020-10-02 | Stop reason: HOSPADM

## 2020-09-30 RX ORDER — CARBOPROST TROMETHAMINE 250 UG/ML
250 INJECTION, SOLUTION INTRAMUSCULAR ONCE AS NEEDED
Status: DISCONTINUED | OUTPATIENT
Start: 2020-09-30 | End: 2020-10-02 | Stop reason: HOSPADM

## 2020-09-30 RX ORDER — OXYTOCIN/0.9 % SODIUM CHLORIDE 30/500 ML
125 PLASTIC BAG, INJECTION (ML) INTRAVENOUS CONTINUOUS PRN
Status: COMPLETED | OUTPATIENT
Start: 2020-09-30 | End: 2020-09-30

## 2020-09-30 RX ORDER — SODIUM CHLORIDE, SODIUM LACTATE, POTASSIUM CHLORIDE, CALCIUM CHLORIDE 600; 310; 30; 20 MG/100ML; MG/100ML; MG/100ML; MG/100ML
125 INJECTION, SOLUTION INTRAVENOUS CONTINUOUS
Status: DISCONTINUED | OUTPATIENT
Start: 2020-09-30 | End: 2020-09-30

## 2020-09-30 RX ORDER — TRISODIUM CITRATE DIHYDRATE AND CITRIC ACID MONOHYDRATE 500; 334 MG/5ML; MG/5ML
30 SOLUTION ORAL ONCE
Status: DISCONTINUED | OUTPATIENT
Start: 2020-09-30 | End: 2020-09-30 | Stop reason: HOSPADM

## 2020-09-30 RX ORDER — EPHEDRINE SULFATE 50 MG/ML
10 INJECTION, SOLUTION INTRAVENOUS
Status: DISCONTINUED | OUTPATIENT
Start: 2020-09-30 | End: 2020-09-30 | Stop reason: HOSPADM

## 2020-09-30 RX ORDER — PROMETHAZINE HYDROCHLORIDE 25 MG/1
12.5 TABLET ORAL EVERY 6 HOURS PRN
Status: DISCONTINUED | OUTPATIENT
Start: 2020-09-30 | End: 2020-09-30 | Stop reason: HOSPADM

## 2020-09-30 RX ORDER — OXYTOCIN/0.9 % SODIUM CHLORIDE 30/500 ML
250 PLASTIC BAG, INJECTION (ML) INTRAVENOUS CONTINUOUS
Status: DISPENSED | OUTPATIENT
Start: 2020-09-30 | End: 2020-09-30

## 2020-09-30 RX ORDER — OXYTOCIN/0.9 % SODIUM CHLORIDE 30/500 ML
2-20 PLASTIC BAG, INJECTION (ML) INTRAVENOUS
Status: DISCONTINUED | OUTPATIENT
Start: 2020-09-30 | End: 2020-09-30 | Stop reason: HOSPADM

## 2020-09-30 RX ORDER — SODIUM CHLORIDE, SODIUM LACTATE, POTASSIUM CHLORIDE, CALCIUM CHLORIDE 600; 310; 30; 20 MG/100ML; MG/100ML; MG/100ML; MG/100ML
125 INJECTION, SOLUTION INTRAVENOUS CONTINUOUS
Status: DISCONTINUED | OUTPATIENT
Start: 2020-09-30 | End: 2020-10-02 | Stop reason: HOSPADM

## 2020-09-30 RX ORDER — ROPIVACAINE HYDROCHLORIDE 2 MG/ML
INJECTION, SOLUTION EPIDURAL; INFILTRATION; PERINEURAL AS NEEDED
Status: DISCONTINUED | OUTPATIENT
Start: 2020-09-30 | End: 2020-10-01 | Stop reason: SURG

## 2020-09-30 RX ORDER — ONDANSETRON 2 MG/ML
4 INJECTION INTRAMUSCULAR; INTRAVENOUS EVERY 6 HOURS PRN
Status: DISCONTINUED | OUTPATIENT
Start: 2020-09-30 | End: 2020-09-30 | Stop reason: HOSPADM

## 2020-09-30 RX ORDER — BUTORPHANOL TARTRATE 1 MG/ML
1 INJECTION, SOLUTION INTRAMUSCULAR; INTRAVENOUS
Status: DISCONTINUED | OUTPATIENT
Start: 2020-09-30 | End: 2020-09-30 | Stop reason: HOSPADM

## 2020-09-30 RX ORDER — METHYLERGONOVINE MALEATE 0.2 MG/ML
200 INJECTION INTRAVENOUS ONCE AS NEEDED
Status: DISCONTINUED | OUTPATIENT
Start: 2020-09-30 | End: 2020-09-30 | Stop reason: HOSPADM

## 2020-09-30 RX ORDER — MISOPROSTOL 200 UG/1
800 TABLET ORAL AS NEEDED
Status: DISCONTINUED | OUTPATIENT
Start: 2020-09-30 | End: 2020-09-30 | Stop reason: HOSPADM

## 2020-09-30 RX ORDER — LIDOCAINE HYDROCHLORIDE AND EPINEPHRINE 15; 5 MG/ML; UG/ML
INJECTION, SOLUTION EPIDURAL AS NEEDED
Status: DISCONTINUED | OUTPATIENT
Start: 2020-09-30 | End: 2020-10-01 | Stop reason: SURG

## 2020-09-30 RX ORDER — BUTORPHANOL TARTRATE 1 MG/ML
2 INJECTION, SOLUTION INTRAMUSCULAR; INTRAVENOUS
Status: DISCONTINUED | OUTPATIENT
Start: 2020-09-30 | End: 2020-09-30 | Stop reason: HOSPADM

## 2020-09-30 RX ORDER — PROMETHAZINE HYDROCHLORIDE 12.5 MG/1
12.5 SUPPOSITORY RECTAL EVERY 6 HOURS PRN
Status: DISCONTINUED | OUTPATIENT
Start: 2020-09-30 | End: 2020-09-30 | Stop reason: HOSPADM

## 2020-09-30 RX ORDER — METHYLERGONOVINE MALEATE 0.2 MG/ML
200 INJECTION INTRAVENOUS ONCE AS NEEDED
Status: DISCONTINUED | OUTPATIENT
Start: 2020-09-30 | End: 2020-10-02 | Stop reason: HOSPADM

## 2020-09-30 RX ORDER — OXYTOCIN/0.9 % SODIUM CHLORIDE 30/500 ML
250 PLASTIC BAG, INJECTION (ML) INTRAVENOUS CONTINUOUS
Status: ACTIVE | OUTPATIENT
Start: 2020-09-30 | End: 2020-09-30

## 2020-09-30 RX ORDER — HYDROCORTISONE 25 MG/G
1 CREAM TOPICAL AS NEEDED
Status: DISCONTINUED | OUTPATIENT
Start: 2020-09-30 | End: 2020-10-02 | Stop reason: HOSPADM

## 2020-09-30 RX ORDER — MISOPROSTOL 200 UG/1
600 TABLET ORAL ONCE AS NEEDED
Status: DISCONTINUED | OUTPATIENT
Start: 2020-09-30 | End: 2020-10-02 | Stop reason: HOSPADM

## 2020-09-30 RX ORDER — LIDOCAINE HYDROCHLORIDE 10 MG/ML
5 INJECTION, SOLUTION EPIDURAL; INFILTRATION; INTRACAUDAL; PERINEURAL AS NEEDED
Status: DISCONTINUED | OUTPATIENT
Start: 2020-09-30 | End: 2020-09-30 | Stop reason: HOSPADM

## 2020-09-30 RX ORDER — CARBOPROST TROMETHAMINE 250 UG/ML
250 INJECTION, SOLUTION INTRAMUSCULAR AS NEEDED
Status: DISCONTINUED | OUTPATIENT
Start: 2020-09-30 | End: 2020-09-30 | Stop reason: HOSPADM

## 2020-09-30 RX ORDER — IBUPROFEN 800 MG/1
800 TABLET ORAL EVERY 8 HOURS PRN
Status: DISCONTINUED | OUTPATIENT
Start: 2020-09-30 | End: 2020-10-02 | Stop reason: HOSPADM

## 2020-09-30 RX ORDER — PRENATAL VIT/IRON FUM/FOLIC AC 27MG-0.8MG
1 TABLET ORAL DAILY
Status: DISCONTINUED | OUTPATIENT
Start: 2020-09-30 | End: 2020-10-02 | Stop reason: HOSPADM

## 2020-09-30 RX ADMIN — OXYCODONE HYDROCHLORIDE AND ACETAMINOPHEN 1 TABLET: 5; 325 TABLET ORAL at 23:39

## 2020-09-30 RX ADMIN — Medication 1 TABLET: at 21:49

## 2020-09-30 RX ADMIN — SODIUM CHLORIDE, POTASSIUM CHLORIDE, SODIUM LACTATE AND CALCIUM CHLORIDE 125 ML/HR: 600; 310; 30; 20 INJECTION, SOLUTION INTRAVENOUS at 11:49

## 2020-09-30 RX ADMIN — OXYTOCIN-SODIUM CHLORIDE 0.9% IV SOLN 30 UNIT/500ML 125 ML/HR: 30-0.9/5 SOLUTION at 13:45

## 2020-09-30 RX ADMIN — SODIUM CHLORIDE, POTASSIUM CHLORIDE, SODIUM LACTATE AND CALCIUM CHLORIDE: 600; 310; 30; 20 INJECTION, SOLUTION INTRAVENOUS at 10:40

## 2020-09-30 RX ADMIN — SUFENTANIL CITRATE 20 MCG: 50 INJECTION EPIDURAL; INTRAVENOUS at 10:47

## 2020-09-30 RX ADMIN — ROPIVACAINE HYDROCHLORIDE 7 ML: 2 INJECTION, SOLUTION EPIDURAL; INFILTRATION at 10:47

## 2020-09-30 RX ADMIN — ROPIVACAINE HYDROCHLORIDE 7 ML: 5 INJECTION, SOLUTION EPIDURAL; INFILTRATION; PERINEURAL at 10:47

## 2020-09-30 RX ADMIN — LIDOCAINE HYDROCHLORIDE AND EPINEPHRINE 3 ML: 15; 5 INJECTION, SOLUTION EPIDURAL at 10:46

## 2020-09-30 RX ADMIN — IBUPROFEN 800 MG: 800 TABLET, FILM COATED ORAL at 21:25

## 2020-09-30 RX ADMIN — ONDANSETRON HYDROCHLORIDE 4 MG: 2 SOLUTION INTRAMUSCULAR; INTRAVENOUS at 11:52

## 2020-09-30 RX ADMIN — SUFENTANIL CITRATE 30 MCG: 50 INJECTION EPIDURAL; INTRAVENOUS at 12:07

## 2020-09-30 RX ADMIN — OXYTOCIN-SODIUM CHLORIDE 0.9% IV SOLN 30 UNIT/500ML 2 MILLI-UNITS/MIN: 30-0.9/5 SOLUTION at 11:49

## 2020-09-30 RX ADMIN — OXYTOCIN-SODIUM CHLORIDE 0.9% IV SOLN 30 UNIT/500ML 999 ML/HR: 30-0.9/5 SOLUTION at 13:06

## 2020-09-30 RX ADMIN — SODIUM CHLORIDE, POTASSIUM CHLORIDE, SODIUM LACTATE AND CALCIUM CHLORIDE 1000 ML: 600; 310; 30; 20 INJECTION, SOLUTION INTRAVENOUS at 11:20

## 2020-09-30 RX ADMIN — DOCUSATE SODIUM 100 MG: 100 CAPSULE ORAL at 20:22

## 2020-09-30 RX ADMIN — ROPIVACAINE HYDROCHLORIDE 10 ML/HR: 2 INJECTION, SOLUTION EPIDURAL; INFILTRATION at 12:07

## 2020-10-01 LAB
HCT VFR BLD AUTO: 30 % (ref 34–46.6)
HGB BLD-MCNC: 9.9 G/DL (ref 12–15.9)

## 2020-10-01 PROCEDURE — 85018 HEMOGLOBIN: CPT | Performed by: OBSTETRICS & GYNECOLOGY

## 2020-10-01 PROCEDURE — 85014 HEMATOCRIT: CPT | Performed by: OBSTETRICS & GYNECOLOGY

## 2020-10-01 RX ADMIN — DOCUSATE SODIUM 100 MG: 100 CAPSULE ORAL at 14:14

## 2020-10-01 RX ADMIN — Medication 1 TABLET: at 14:14

## 2020-10-01 RX ADMIN — IBUPROFEN 800 MG: 800 TABLET, FILM COATED ORAL at 14:14

## 2020-10-01 RX ADMIN — OXYCODONE HYDROCHLORIDE AND ACETAMINOPHEN 1 TABLET: 5; 325 TABLET ORAL at 21:14

## 2020-10-01 RX ADMIN — OXYCODONE HYDROCHLORIDE AND ACETAMINOPHEN 1 TABLET: 5; 325 TABLET ORAL at 05:55

## 2020-10-01 RX ADMIN — DOCUSATE SODIUM 100 MG: 100 CAPSULE ORAL at 21:12

## 2020-10-01 RX ADMIN — SODIUM CHLORIDE, PRESERVATIVE FREE 10 ML: 5 INJECTION INTRAVENOUS at 21:14

## 2020-10-01 RX ADMIN — OXYCODONE HYDROCHLORIDE AND ACETAMINOPHEN 1 TABLET: 5; 325 TABLET ORAL at 14:14

## 2020-10-01 NOTE — PLAN OF CARE
Goal Outcome Evaluation:  Plan of Care Reviewed With: patient  Progress: improving  Outcome Summary: vitals stable, fundus firm ml u1, painb at level of tolerance, continues to breastfeed bonding with infant.

## 2020-10-01 NOTE — LACTATION NOTE
Mother's Name: Yuly Fernandes   Phone #: [phone]  Infant Name: Shu     : 20  Gestation: 38w  Day of life: 1  Birth weight:  7-8.3 (3410g)    Discharge weight:  Weight Loss: -2.58%   24 hour Summary of Feeds: 6 + 0.25 BM  Voids: 6 Stools:  2 Emesis: 2  Assistive devices (shields, shells, etc): None  Significant Maternal history: , attempted to breastfeed with first child but had difficulty and pumped for 3 weeks  Maternal Concerns:    Maternal Goal: BF exclusively   Mother's Medications: Zofran, PNV  Breastpump for home: Yes  Ped follow up appt:    Called to room by patient to assess latch. Infant skin to skin, latched, and actively breastfeeding upon visit. Latch appeared apropriate. Patient compressing breast to keep infant sucking. Deep jaw drops with audible swallows noted. Patient reports tugging with latch. States nipples are tender, the right more so than the left. Right nipple noted to have compression stripe bruising. Discussed positioning, deep latching techniques, infant weight loss, voids/stools, waking techniques, and signs of effective breastfeeding. Praise provided for breastfeeding success thus far. Ongoing lactation support offered. Understanding verbalized. Questions denied.     Instructed mom our lactation team is here for continued support throughout their breastfeeding journey. Our team has encouraged mom to call with any questions or concerns that may arise after discharge.    1050  Patient called with questions. States she doesn't know what she wants to do. States she is exhausted and her nipples are tender. States it hurts too bad to latch infant. Patient asked if she would be able to pump, bottle feed, and supplement with formula. Assured patient of lactation support with chosen feeding plan. Discussed pumping, supply/demand, bottle feeding, and feeding amounts. Offered support and assistance with pumping or if patient chooses to latch infant. Offered to set up  hospital grade pump. Patient desires to use her residential pump. Recommended calling lactation for any assistance needed. Patient appreciative. Questions denied.

## 2020-10-01 NOTE — ANESTHESIA POSTPROCEDURE EVALUATION
Patient: Yuly Fernandes    Procedure Summary     Date: 09/30/20 Room / Location:     Anesthesia Start: 1040 Anesthesia Stop: 1304    Procedure: LABOR ANALGESIA Diagnosis:     Scheduled Providers:  Provider: Aadm Sepulveda CRNA    Anesthesia Type: epidural ASA Status: 2          Anesthesia Type: epidural    Vitals  Vitals Value Taken Time   /71 10/01/20 0800   Temp 98.3 °F (36.8 °C) 10/01/20 0800   Pulse 80 10/01/20 0800   Resp 16 10/01/20 0800   SpO2 99 % 10/01/20 0800           Post Anesthesia Care and Evaluation    Anesthetic complications: No anesthetic complications  Post Neuraxial Block status: Motor and sensory function returned to baseline and No signs or symptoms of PDPH

## 2020-10-01 NOTE — PLAN OF CARE
Goal Outcome Evaluation:  Plan of Care Reviewed With: patient  Progress: improving  Outcome Summary: VSS, FF, ML, U2, scant bleeding, tolerating pain with PO pain meds, formula feeding and using breast pump

## 2020-10-01 NOTE — PROGRESS NOTES
"[facility]  Vaginal Delivery Progress Note    Subjective   Postpartum Day 1: Vaginal Delivery    The patient feels well.  Her pain is well controlled with prescribed pain medications.   She is ambulating well.  Patient describes her bleeding as moderate lochia.    Breastfeeding: without difficulty.    Objective     Vital Signs Range for the last 24 hours  Temperature: Temp:  [96.9 °F (36.1 °C)-99 °F (37.2 °C)] 98.3 °F (36.8 °C)   Temp Source: Temp src: Temporal   BP: BP: (110-144)/(33-95) 119/71   Pulse: Heart Rate:  [] 80   Respirations: Resp:  [16-20] 16   SPO2: SpO2:  [96 %-100 %] 99 %   O2 Amount (l/min):     O2 Devices Device (Oxygen Therapy): room air   Weight: Weight:  [75.8 kg (167 lb)] 75.8 kg (167 lb)     Admit Height:  Height: 167.6 cm (66\")      Physical Exam:  General:  no acute distresss.  Abdomen: Fundus: appropriate, firm, non tender  Extremities: normal, atraumatic, no cyanosis, and trace edema.     Lab results reviewed:  Yes   Rubella:  No results found for: RUBELLAIGGIN Nurse Transcribed from prenatal record --  No components found for: EXTRUBELQUAL  Rh Status:    RH type   Date Value Ref Range Status   09/30/2020 Positive  Final     Immunizations:   Immunization History   Administered Date(s) Administered   • Tdap 05/16/2018       Assessment/Plan       * No active hospital problems. *      Yuly Fernandes is Day 1  post-partum  Vaginal, Vacuum (Extractor)   .      Plan:  Continue current care.      Analy Olson MD  10/1/2020  10:14 CDT  "

## 2020-10-02 VITALS
RESPIRATION RATE: 18 BRPM | DIASTOLIC BLOOD PRESSURE: 74 MMHG | SYSTOLIC BLOOD PRESSURE: 114 MMHG | HEIGHT: 66 IN | WEIGHT: 167 LBS | TEMPERATURE: 98.3 F | HEART RATE: 75 BPM | OXYGEN SATURATION: 99 % | BODY MASS INDEX: 26.84 KG/M2

## 2020-10-02 RX ORDER — IBUPROFEN 800 MG/1
800 TABLET ORAL EVERY 8 HOURS PRN
Qty: 90 TABLET | Refills: 2 | Status: SHIPPED | OUTPATIENT
Start: 2020-10-02 | End: 2022-05-26 | Stop reason: HOSPADM

## 2020-10-02 RX ORDER — OXYCODONE HYDROCHLORIDE AND ACETAMINOPHEN 5; 325 MG/1; MG/1
1 TABLET ORAL EVERY 6 HOURS PRN
Qty: 10 TABLET | Refills: 0 | Status: SHIPPED | OUTPATIENT
Start: 2020-10-02 | End: 2020-10-10

## 2020-10-02 RX ADMIN — Medication 1 TABLET: at 08:29

## 2020-10-02 RX ADMIN — IBUPROFEN 800 MG: 800 TABLET, FILM COATED ORAL at 11:17

## 2020-10-02 RX ADMIN — OXYCODONE HYDROCHLORIDE AND ACETAMINOPHEN 1 TABLET: 5; 325 TABLET ORAL at 04:46

## 2020-10-02 RX ADMIN — DOCUSATE SODIUM 100 MG: 100 CAPSULE ORAL at 08:29

## 2020-10-02 NOTE — PLAN OF CARE
Goal Outcome Evaluation:  Plan of Care Reviewed With: patient  Progress: improving  Outcome Summary: vitals stable, fundus firm ml, u2. lochia small, pain at level of tolerance, giving expressed breast milk and formula to infant, bonding with infant.

## 2020-10-02 NOTE — LACTATION NOTE
Mother's Name: Yuly Fernandes      Phone #: [phone]  Infant Name: Shu        : 20  Gestation: 38w  Day of life: 2  Birth weight:  7-8.3 (3410g)       Discharge weight: 7-2 (3233g)  Weight Loss: -5.19%   24 hour Summary of Feeds: 1 BF, Formula X7 (213 ml), 8.5 ml BM  Voids: 4 Stools:  4 Emesis: 0  Assistive devices (shields, shells, etc): None  Significant Maternal history: , attempted to breastfeed with first child but had difficulty and pumped for 3 weeks  Maternal Concerns: None at this time.   Maternal Goal: Pump/formula feed   Mother's Medications: Zofran, PNV  Breastpump for home: Yes  Ped follow up appt: [facility]--Pediatrics with NP 10/5/20    Patient reports she continues to pump and bottle feed. States she does not plan to put infant back to breast. States she is happy with current feeding plan. Praise provided. Discussed nipple care, maternal rest/nutrition/fluid intake, medications, pumping, flange size, plugged ducts, engorgement, mastitis, infant feedings/voids/stools, and outpatient lactation support. Patient verbalized understanding. Questions denied.     Instructed mom our lactation team is here for continued support throughout their breastfeeding journey. Our team has encouraged mom to call with any questions or concerns that may arise after discharge.

## 2020-10-02 NOTE — DISCHARGE SUMMARY
Discharge Summary     Jarred Fernandes  : 2000  MRN: [id number]  [facility]: [id number]    Date of Admission: 2020   Date of Discharge:  10/2/2020   Delivering Physician: Analy Olson        Admission Diagnosis: 1. Normal labor [O80, Z37.9]  2. Vacuum extractor delivery, delivered [O66.5]   Discharge Diagnosis: 1. Pregnancy at 40w4d - delivered       Procedures: 2020  - Vaginal, Vacuum (Extractor)       Hospital Course  Patient is a 20 y.o.  who at 40w4d had a uncomplicated vaginal delivery.  Her postpartum course was without complications.  On PPD #2 she was ready for discharge.  She had normal lochia and pain was well controlled with oral medications.    Infant  female  fetus weighing 3410 g (7 lb 8.3 oz)   Apgars -  9 @ 1 minute /  9 @ 5 minutes.    Discharge labs  Lab Results   Component Value Date    WBC 14.49 (H) 2020    HGB 9.9 (L) 10/01/2020    HCT 30.0 (L) 10/01/2020     2020       Discharge Medications     Discharge Medications      New Medications      Instructions Start Date   ibuprofen 800 MG tablet  Commonly known as: ADVIL,MOTRIN   800 mg, Oral, Every 8 Hours PRN      oxyCODONE-acetaminophen 5-325 MG per tablet  Commonly known as: PERCOCET   1 tablet, Oral, Every 6 Hours PRN         Continue These Medications      Instructions Start Date   ondansetron 4 MG tablet  Commonly known as: ZOFRAN   4 mg, Oral, Every 8 Hours PRN      prenatal (CLASSIC) vitamin  tablet  Generic drug: prenatal vitamin   1 tablet, Oral, Daily             Discharge Disposition Home or Self Care   Condition on Discharge: good   Follow-up: 4 weeks with Dr. Olson, if no appointment, please call office     Analy Olson MD  10/2/2020

## 2020-10-02 NOTE — PROGRESS NOTES
"[facility]  Vaginal Delivery Progress Note    Subjective   Postpartum Day 2: Vaginal Delivery    The patient feels well.  Her pain is well controlled with prescribed pain medications.   She is ambulating well.  Patient describes her bleeding as thin lochia.    Breastfeeding: without difficulty.    Objective     Vital Signs Range for the last 24 hours  Temperature: Temp:  [97.8 °F (36.6 °C)-98.3 °F (36.8 °C)] 97.9 °F (36.6 °C)   Temp Source: Temp src: Axillary   BP: BP: (119-123)/(71-79) 123/79   Pulse: Heart Rate:  [71-80] 71   Respirations: Resp:  [16] 16   SPO2: SpO2:  [97 %-99 %] 98 %   O2 Amount (l/min):     O2 Devices Device (Oxygen Therapy): room air   Weight:       Admit Height:  Height: 167.6 cm (66\")      Physical Exam:  General:  no acute distresss.  Abdomen: Fundus: appropriate, firm, non tender  Extremities: normal, atraumatic, no cyanosis, and trace edema.     Lab results reviewed:  Yes   Rubella:  No results found for: RUBELLAIGGIN Nurse Transcribed from prenatal record --  No components found for: EXTRUBELQUAL  Rh Status:    RH type   Date Value Ref Range Status   09/30/2020 Positive  Final     Immunizations:   Immunization History   Administered Date(s) Administered   • Tdap 05/16/2018       Assessment/Plan       * No active hospital problems. *      Yuly Patel Dom is Day 2  post-partum  Vaginal, Vacuum (Extractor)   .      Plan:  Discharge home with standard precautions and return to clinic in 4-6 weeks.      Analy Olson MD  10/2/2020  07:38 CDT  "

## 2020-10-20 LAB
CYTO UR: NORMAL
LAB AP CASE REPORT: NORMAL
PATH REPORT.FINAL DX SPEC: NORMAL
PATH REPORT.GROSS SPEC: NORMAL

## 2021-02-23 ENCOUNTER — LAB (OUTPATIENT)
Dept: LAB | Facility: HOSPITAL | Age: 21
End: 2021-02-23

## 2021-02-23 ENCOUNTER — OFFICE VISIT (OUTPATIENT)
Dept: FAMILY MEDICINE CLINIC | Facility: CLINIC | Age: 21
End: 2021-02-23

## 2021-02-23 VITALS
OXYGEN SATURATION: 98 % | DIASTOLIC BLOOD PRESSURE: 80 MMHG | BODY MASS INDEX: 22.76 KG/M2 | SYSTOLIC BLOOD PRESSURE: 110 MMHG | HEART RATE: 81 BPM | TEMPERATURE: 97.7 F | WEIGHT: 141.6 LBS | HEIGHT: 66 IN

## 2021-02-23 DIAGNOSIS — R11.0 NAUSEA: ICD-10-CM

## 2021-02-23 DIAGNOSIS — H61.23 BILATERAL IMPACTED CERUMEN: ICD-10-CM

## 2021-02-23 DIAGNOSIS — R42 DIZZINESS: Primary | ICD-10-CM

## 2021-02-23 DIAGNOSIS — J31.0 RHINITIS, UNSPECIFIED TYPE: ICD-10-CM

## 2021-02-23 LAB
ALBUMIN SERPL-MCNC: 4.7 G/DL (ref 3.5–5.2)
ALBUMIN/GLOB SERPL: 1.4 G/DL
ALP SERPL-CCNC: 67 U/L (ref 39–117)
ALT SERPL W P-5'-P-CCNC: 12 U/L (ref 1–33)
ANION GAP SERPL CALCULATED.3IONS-SCNC: 10 MMOL/L (ref 5–15)
AST SERPL-CCNC: 12 U/L (ref 1–32)
B-HCG UR QL: NEGATIVE
BILIRUB SERPL-MCNC: 1.3 MG/DL (ref 0–1.2)
BILIRUB UR QL STRIP: NEGATIVE
BUN SERPL-MCNC: 21 MG/DL (ref 6–20)
BUN/CREAT SERPL: 27.3 (ref 7–25)
CALCIUM SPEC-SCNC: 9.8 MG/DL (ref 8.6–10.5)
CHLORIDE SERPL-SCNC: 103 MMOL/L (ref 98–107)
CLARITY UR: CLEAR
CO2 SERPL-SCNC: 27 MMOL/L (ref 22–29)
COLOR UR: YELLOW
CREAT SERPL-MCNC: 0.77 MG/DL (ref 0.57–1)
DEPRECATED RDW RBC AUTO: 38.8 FL (ref 37–54)
ERYTHROCYTE [DISTWIDTH] IN BLOOD BY AUTOMATED COUNT: 12.4 % (ref 12.3–15.4)
GFR SERPL CREATININE-BSD FRML MDRD: 93 ML/MIN/1.73
GLOBULIN UR ELPH-MCNC: 3.4 GM/DL
GLUCOSE SERPL-MCNC: 88 MG/DL (ref 65–99)
GLUCOSE UR STRIP-MCNC: NEGATIVE MG/DL
HCT VFR BLD AUTO: 40.2 % (ref 34–46.6)
HGB BLD-MCNC: 14.2 G/DL (ref 12–15.9)
HGB UR QL STRIP.AUTO: NEGATIVE
IRON 24H UR-MRATE: 100 MCG/DL (ref 37–145)
IRON SATN MFR SERPL: 19 % (ref 20–50)
KETONES UR QL STRIP: NEGATIVE
LEUKOCYTE ESTERASE UR QL STRIP.AUTO: NEGATIVE
MCH RBC QN AUTO: 30.4 PG (ref 26.6–33)
MCHC RBC AUTO-ENTMCNC: 35.3 G/DL (ref 31.5–35.7)
MCV RBC AUTO: 86.1 FL (ref 79–97)
NITRITE UR QL STRIP: NEGATIVE
PH UR STRIP.AUTO: 6 [PH] (ref 5–8)
PLATELET # BLD AUTO: 212 10*3/MM3 (ref 140–450)
PMV BLD AUTO: 12 FL (ref 6–12)
POTASSIUM SERPL-SCNC: 3.9 MMOL/L (ref 3.5–5.2)
PROT SERPL-MCNC: 8.1 G/DL (ref 6–8.5)
PROT UR QL STRIP: NEGATIVE
RBC # BLD AUTO: 4.67 10*6/MM3 (ref 3.77–5.28)
SODIUM SERPL-SCNC: 140 MMOL/L (ref 136–145)
SP GR UR STRIP: >=1.03 (ref 1–1.03)
TIBC SERPL-MCNC: 516 MCG/DL (ref 298–536)
TRANSFERRIN SERPL-MCNC: 346 MG/DL (ref 200–360)
UROBILINOGEN UR QL STRIP: ABNORMAL
WBC # BLD AUTO: 8.79 10*3/MM3 (ref 3.4–10.8)

## 2021-02-23 PROCEDURE — 84466 ASSAY OF TRANSFERRIN: CPT | Performed by: FAMILY MEDICINE

## 2021-02-23 PROCEDURE — 85027 COMPLETE CBC AUTOMATED: CPT | Performed by: FAMILY MEDICINE

## 2021-02-23 PROCEDURE — 80053 COMPREHEN METABOLIC PANEL: CPT | Performed by: FAMILY MEDICINE

## 2021-02-23 PROCEDURE — 99204 OFFICE O/P NEW MOD 45 MIN: CPT | Performed by: FAMILY MEDICINE

## 2021-02-23 PROCEDURE — 81025 URINE PREGNANCY TEST: CPT | Performed by: FAMILY MEDICINE

## 2021-02-23 PROCEDURE — 36415 COLL VENOUS BLD VENIPUNCTURE: CPT | Performed by: FAMILY MEDICINE

## 2021-02-23 PROCEDURE — 83540 ASSAY OF IRON: CPT | Performed by: FAMILY MEDICINE

## 2021-02-23 PROCEDURE — 81003 URINALYSIS AUTO W/O SCOPE: CPT | Performed by: FAMILY MEDICINE

## 2021-02-23 PROCEDURE — 69209 REMOVE IMPACTED EAR WAX UNI: CPT | Performed by: FAMILY MEDICINE

## 2021-02-23 RX ORDER — FLUTICASONE PROPIONATE 50 MCG
SPRAY, SUSPENSION (ML) NASAL
Qty: 18.2 ML | Refills: 1 | Status: SHIPPED | OUTPATIENT
Start: 2021-02-23 | End: 2021-03-16 | Stop reason: SDUPTHER

## 2021-02-23 RX ORDER — DESOGESTREL AND ETHINYL ESTRADIOL 0.15-0.03
1 KIT ORAL DAILY
COMMUNITY
Start: 2021-02-14 | End: 2022-03-11 | Stop reason: HOSPADM

## 2021-02-23 NOTE — PATIENT INSTRUCTIONS
-Do the labs today  - Drink lots of water  - Start Flonase nasal spray once a day to open up the blocked ear tubes and decrease the nose congestion; see if that helps the dizziness  - Use a low sodium diet to prevent dizziness and extra fluid behind the ears/inner ear symptoms  - Use the ear wax drops to dissolve the wax in both ears and see if that changes how the ears feel  - We will send you the results on the labs on [facility].        Earwax Buildup, Adult  The ears produce a substance called earwax that helps keep bacteria out of the ear and protects the skin in the ear canal. Occasionally, earwax can build up in the ear and cause discomfort or hearing loss.  What increases the risk?  This condition is more likely to develop in people who:  · Are male.  · Are elderly.  · Naturally produce more earwax.  · Clean their ears often with cotton swabs.  · Use earplugs often.  · Use in-ear headphones often.  · Wear hearing aids.  · Have narrow ear canals.  · Have earwax that is overly thick or sticky.  · Have eczema.  · Are dehydrated.  · Have excess hair in the ear canal.  What are the signs or symptoms?  Symptoms of this condition include:  · Reduced or muffled hearing.  · A feeling of fullness in the ear or feeling that the ear is plugged.  · Fluid coming from the ear.  · Ear pain.  · Ear itch.  · Ringing in the ear.  · Coughing.  · An obvious piece of earwax that can be seen inside the ear canal.  How is this diagnosed?  This condition may be diagnosed based on:  · Your symptoms.  · Your medical history.  · An ear exam. During the exam, your health care provider will look into your ear with an instrument called an otoscope.  You may have tests, including a hearing test.  How is this treated?  This condition may be treated by:  · Using ear drops to soften the earwax.  · Having the earwax removed by a health care provider. The health care provider may:  ? Flush the ear with water.  ? Use an instrument that has a loop  on the end (curette).  ? Use a suction device.  · Surgery to remove the wax buildup. This may be done in severe cases.  Follow these instructions at home:    · Take over-the-counter and prescription medicines only as told by your health care provider.  · Do not put any objects, including cotton swabs, into your ear. You can clean the opening of your ear canal with a washcloth or facial tissue.  · Follow instructions from your health care provider about cleaning your ears. Do not over-clean your ears.  · Drink enough fluid to keep your urine clear or pale yellow. This will help to thin the earwax.  · Keep all follow-up visits as told by your health care provider. If earwax builds up in your ears often or if you use hearing aids, consider seeing your health care provider for routine, preventive ear cleanings. Ask your health care provider how often you should schedule your cleanings.  · If you have hearing aids, clean them according to instructions from the  and your health care provider.  Contact a health care provider if:  · You have ear pain.  · You develop a fever.  · You have blood, pus, or other fluid coming from your ear.  · You have hearing loss.  · You have ringing in your ears that does not go away.  · Your symptoms do not improve with treatment.  · You feel like the room is spinning (vertigo).  Summary  · Earwax can build up in the ear and cause discomfort or hearing loss.  · The most common symptoms of this condition include reduced or muffled hearing and a feeling of fullness in the ear or feeling that the ear is plugged.  · This condition may be diagnosed based on your symptoms, your medical history, and an ear exam.  · This condition may be treated by using ear drops to soften the earwax or by having the earwax removed by a health care provider.  · Do not put any objects, including cotton swabs, into your ear. You can clean the opening of your ear canal with a washcloth or facial  tissue.  This information is not intended to replace advice given to you by your health care provider. Make sure you discuss any questions you have with your health care provider.  Document Revised: 11/30/2018 Document Reviewed: 02/28/2018  Elsevier Patient Education © 2020 [employer]  Dizziness  Dizziness is a common problem. It is a feeling of unsteadiness or light-headedness. You may feel like you are about to faint. Dizziness can lead to injury if you stumble or fall. Anyone can become dizzy, but dizziness is more common in older adults. This condition can be caused by a number of things, including medicines, dehydration, or illness.  Follow these instructions at home:  Eating and drinking  · Drink enough fluid to keep your urine clear or pale yellow. This helps to keep you from becoming dehydrated. Try to drink more clear fluids, such as water.  · Do not drink alcohol.  · Limit your caffeine intake if told to do so by your health care provider. Check ingredients and nutrition facts to see if a food or beverage contains caffeine.  · Limit your salt (sodium) intake if told to do so by your health care provider. Check ingredients and nutrition facts to see if a food or beverage contains sodium.  Activity  · Avoid making quick movements.  ? Rise slowly from chairs and steady yourself until you feel okay.  ? In the morning, first sit up on the side of the bed. When you feel okay, stand slowly while you hold onto something until you know that your balance is fine.  · If you need to  one place for a long time, move your legs often. Tighten and relax the muscles in your legs while you are standing.  · Do not drive or use heavy machinery if you feel dizzy.  · Avoid bending down if you feel dizzy. Place items in your home so that they are easy for you to reach without leaning over.  Lifestyle  · Do not use any products that contain nicotine or tobacco, such as cigarettes and e-cigarettes. If you need help  quitting, ask your health care provider.  · Try to reduce your stress level by using methods such as yoga or meditation. Talk with your health care provider if you need help to manage your stress.  General instructions  · Watch your dizziness for any changes.  · Take over-the-counter and prescription medicines only as told by your health care provider. Talk with your health care provider if you think that your dizziness is caused by a medicine that you are taking.  · Tell a friend or a family member that you are feeling dizzy. If he or she notices any changes in your behavior, have this person call your health care provider.  · Keep all follow-up visits as told by your health care provider. This is important.  Contact a health care provider if:  · Your dizziness does not go away.  · Your dizziness or light-headedness gets worse.  · You feel nauseous.  · You have reduced hearing.  · You have new symptoms.  · You are unsteady on your feet or you feel like the room is spinning.  Get help right away if:  · You vomit or have diarrhea and are unable to eat or drink anything.  · You have problems talking, walking, swallowing, or using your arms, hands, or legs.  · You feel generally weak.  · You are not thinking clearly or you have trouble forming sentences. It may take a friend or family member to notice this.  · You have chest pain, abdominal pain, shortness of breath, or sweating.  · Your vision changes.  · You have any bleeding.  · You have a severe headache.  · You have neck pain or a stiff neck.  · You have a fever.  These symptoms may represent a serious problem that is an emergency. Do not wait to see if the symptoms will go away. Get medical help right away. Call your local emergency services (911 in the [place] Do not drive yourself to the hospital.  Summary  · Dizziness is a feeling of unsteadiness or light-headedness. This condition can be caused by a number of things, including medicines, dehydration, or  "illness.  · Anyone can become dizzy, but dizziness is more common in older adults.  · Drink enough fluid to keep your urine clear or pale yellow. Do not drink alcohol.  · Avoid making quick movements if you feel dizzy. Monitor your dizziness for any changes.  This information is not intended to replace advice given to you by your health care provider. Make sure you discuss any questions you have with your health care provider.  Document Revised: 12/21/2018 Document Reviewed: 01/20/2018  [employer] Patient Education © 2020 [employer]    Low-Sodium Eating Plan  Sodium, which is an element that makes up salt, helps you maintain a healthy balance of fluids in your body. Too much sodium can increase your blood pressure and cause fluid and waste to be held in your body.  Your health care provider or dietitian may recommend following this plan if you have high blood pressure (hypertension), kidney disease, liver disease, or heart failure. Eating less sodium can help lower your blood pressure, reduce swelling, and protect your heart, liver, and kidneys.  What are tips for following this plan?  General guidelines  · Most people on this plan should limit their sodium intake to 1,500-2,000 mg (milligrams) of sodium each day.  Reading food labels    · The Nutrition Facts label lists the amount of sodium in one serving of the food. If you eat more than one serving, you must multiply the listed amount of sodium by the number of servings.  · Choose foods with less than 140 mg of sodium per serving.  · Avoid foods with 300 mg of sodium or more per serving.  Shopping  · Look for lower-sodium products, often labeled as \"low-sodium\" or \"no salt added.\"  · Always check the sodium content even if foods are labeled as \"unsalted\" or \"no salt added\".  · Buy fresh foods.  ? Avoid canned foods and premade or frozen meals.  ? Avoid canned, cured, or processed meats  · Buy breads that have less than 80 mg of sodium per slice.  Cooking  · Eat " "more home-cooked food and less restaurant, buffet, and fast food.  · Avoid adding salt when cooking. Use salt-free seasonings or herbs instead of table salt or sea salt. Check with your health care provider or pharmacist before using salt substitutes.  · Cook with plant-based oils, such as canola, sunflower, or olive oil.  Meal planning  · When eating at a restaurant, ask that your food be prepared with less salt or no salt, if possible.  · Avoid foods that contain MSG (monosodium glutamate). MSG is sometimes added to Chinese food, bouillon, and some canned foods.  What foods are recommended?  The items listed may not be a complete list. Talk with your dietitian about what dietary choices are best for you.  Grains  Low-sodium cereals, including oats, puffed wheat and rice, and shredded wheat. Low-sodium crackers. Unsalted rice. Unsalted pasta. Low-sodium bread. Whole-grain breads and whole-grain pasta.  Vegetables  Fresh or frozen vegetables. \"No salt added\" canned vegetables. \"No salt added\" tomato sauce and paste. Low-sodium or reduced-sodium tomato and vegetable juice.  Fruits  Fresh, frozen, or canned fruit. Fruit juice.  Meats and other protein foods  Fresh or frozen (no salt added) meat, poultry, seafood, and fish. Low-sodium canned tuna and salmon. Unsalted nuts. Dried peas, beans, and lentils without added salt. Unsalted canned beans. Eggs. Unsalted nut butters.  Dairy  Milk. Soy milk. Cheese that is naturally low in sodium, such as ricotta cheese, fresh mozzarella, or Swiss cheese Low-sodium or reduced-sodium cheese. Cream cheese. Yogurt.  Fats and oils  Unsalted butter. Unsalted margarine with no trans fat. Vegetable oils such as canola or olive oils.  Seasonings and other foods  Fresh and dried herbs and spices. Salt-free seasonings. Low-sodium mustard and ketchup. Sodium-free salad dressing. Sodium-free light mayonnaise. Fresh or refrigerated horseradish. Lemon juice. Vinegar. Homemade, reduced-sodium, " or low-sodium soups. Unsalted popcorn and pretzels. Low-salt or salt-free chips.  What foods are not recommended?  The items listed may not be a complete list. Talk with your dietitian about what dietary choices are best for you.  Grains  Instant hot cereals. Bread stuffing, pancake, and biscuit mixes. Croutons. Seasoned rice or pasta mixes. Noodle soup cups. Boxed or frozen macaroni and cheese. Regular salted crackers. Self-rising flour.  Vegetables  Sauerkraut, pickled vegetables, and relishes. Olives. French fries. Onion rings. Regular canned vegetables (not low-sodium or reduced-sodium). Regular canned tomato sauce and paste (not low-sodium or reduced-sodium). Regular tomato and vegetable juice (not low-sodium or reduced-sodium). Frozen vegetables in sauces.  Meats and other protein foods  Meat or fish that is salted, canned, smoked, spiced, or pickled. Cardoza, ham, sausage, hotdogs, corned beef, chipped beef, packaged lunch meats, salt pork, jerky, pickled herring, anchovies, regular canned tuna, sardines, salted nuts.  Dairy  Processed cheese and cheese spreads. Cheese curds. Blue cheese. Feta cheese. String cheese. Regular cottage cheese. Buttermilk. Canned milk.  Fats and oils  Salted butter. Regular margarine. Ghee. Cardoza fat.  Seasonings and other foods  Onion salt, garlic salt, seasoned salt, table salt, and sea salt. Canned and packaged gravies. Worcestershire sauce. Tartar sauce. Barbecue sauce. Teriyaki sauce. Soy sauce, including reduced-sodium. Steak sauce. Fish sauce. Oyster sauce. Cocktail sauce. Horseradish that you find on the shelf. Regular ketchup and mustard. Meat flavorings and tenderizers. Bouillon cubes. Hot sauce and Tabasco sauce. Premade or packaged marinades. Premade or packaged taco seasonings. Relishes. Regular salad dressings. Salsa. Potato and tortilla chips. Corn chips and puffs. Salted popcorn and pretzels. Canned or dried soups. Pizza. Frozen entrees and pot  yony.  Summary  · Eating less sodium can help lower your blood pressure, reduce swelling, and protect your heart, liver, and kidneys.  · Most people on this plan should limit their sodium intake to 1,500-2,000 mg (milligrams) of sodium each day.  · Canned, boxed, and frozen foods are high in sodium. Restaurant foods, fast foods, and pizza are also very high in sodium. You also get sodium by adding salt to food.  · Try to cook at home, eat more fresh fruits and vegetables, and eat less fast food, canned, processed, or prepared foods.  This information is not intended to replace advice given to you by your health care provider. Make sure you discuss any questions you have with your health care provider.  Document Revised: 11/30/2018 Document Reviewed: 12/11/2017  Elsevier Patient Education © 2020 [employer] Inc.

## 2021-02-23 NOTE — PROGRESS NOTES
Chief Complaint   Patient presents with   • Establish Care   • Ear Problem     per patient check ear drums for fluid       History:  Yuly Fernandes is a 21 y.o. female who presents today for evaluation of the above problems.  She is a new pt.      She is , Has two daughters, younger girl is 5 mos,  Older girl is 3 yo.    Pt CC today is rotational vertigo episode yesterday that came on suddenly and lasted all day.  Works as a  at [employer].  On her feet all day.  Had to sit down on the floor yesterday to make the dizziness improve.  Closing her eyes made the sx slightly better.  A few days ago she had a sensation of beating in on her ear drums.  She has seasonal allergies.  Has had normal eating pattern.    Yesterday had bagel with cream cheese and water for breakfast.  Had Mexican food the night before this episode came on though.      Uses no allergy meds.  No claritin type meds or nasal sprays.  Denies pain but has discomfort in her ears.  Had HA last night at temples B that went around parietal areas to posterior head.  No meds used, going to sleep made the HA go away.  No double vision.    She wants labs to work up for cause of dizziness.  Denies intracranial issues and has no recent injury.  Had H/H of 9.9/30 post partum on 10/1/2020.    Denies associated vomiting.      ROS:  Review of Systems   Constitutional: Negative for fever.   HENT: Positive for postnasal drip. Negative for congestion, ear discharge, rhinorrhea and sore throat.    Respiratory: Negative for cough, shortness of breath and wheezing.    Cardiovascular: Negative for chest pain and palpitations.   Gastrointestinal: Negative for nausea and vomiting.        Nausea was there last night, better now.   Endocrine:        Not nursing her baby.  Denies BG issues.   Genitourinary:        Is on OCPs since the last baby was born; no change in type, done well but is a little irregular.  Had 6 day period recently and before  "that it was 21 days of bleeding which is unusual for her.   Neurological: Negative for light-headedness.       No Known Allergies    Past Medical History:   Diagnosis Date   • Urinary tract infection        History reviewed. No pertinent surgical history.    Family History   Problem Relation Age of Onset   • Diabetes Maternal Great-Grandfather    • Dementia Maternal Great-Grandfather        Yuly  reports that she has never smoked. She has never used smokeless tobacco. She reports current alcohol use. She reports that she does not use drugs.    Outpatient Medications Prior to Visit   Medication Sig Dispense Refill   • ibuprofen (ADVIL,MOTRIN) 800 MG tablet Take 1 tablet by mouth Every 8 (Eight) Hours As Needed for Mild Pain . 90 tablet 2   • Isibloom 0.15-30 MG-MCG per tablet Take 1 tablet by mouth Daily.     • ondansetron (ZOFRAN) 4 MG tablet Take 4 mg by mouth Every 8 (Eight) Hours As Needed for Nausea or Vomiting.     • Prenatal Vit-Fe Fumarate-FA (PRENATAL, CLASSIC, VITAMIN) 28-0.8 MG tablet tablet Take 1 tablet by mouth Daily.       No facility-administered medications prior to visit.         OBJECTIVE:  /80 (BP Location: Left arm, Patient Position: Sitting, Cuff Size: Adult)   Pulse 81   Temp 97.7 °F (36.5 °C) (Temporal)   Ht 167.6 cm (66\")   Wt 64.2 kg (141 lb 9.6 oz)   SpO2 98%   Breastfeeding No   BMI 22.85 kg/m²      Physical Exam  Vitals signs reviewed.   Constitutional:       General: She is not in acute distress.     Appearance: She is not ill-appearing.      Comments: WD, WN WG, WF with pleasant manner   HENT:      Head: Normocephalic and atraumatic.      Right Ear: There is impacted cerumen.      Left Ear: There is impacted cerumen.      Ears:      Comments: Both EACs are filled with medium brown wax plugs, can't see around them.     Nose: Congestion present.      Comments: Mild congestion and clear colorless webbed mucus     Mouth/Throat:      Mouth: Mucous membranes are moist.      " Pharynx: Oropharynx is clear. No oropharyngeal exudate or posterior oropharyngeal erythema.   Eyes:      General:         Right eye: No discharge.         Left eye: No discharge.      Conjunctiva/sclera: Conjunctivae normal.      Comments: Lids normal.   Neck:      Musculoskeletal: Neck supple. No muscular tenderness.      Comments: Normal thyroid exam  Cardiovascular:      Rate and Rhythm: Normal rate and regular rhythm.      Pulses: Normal pulses.      Heart sounds: No murmur.      Comments: S4 gallop noted when holding her breath or breathing in deeply.  Pulmonary:      Effort: Pulmonary effort is normal. No respiratory distress.      Breath sounds: Normal breath sounds. No wheezing.   Abdominal:      General: Abdomen is flat. Bowel sounds are normal. There is no distension.      Palpations: Abdomen is soft.      Tenderness: There is no abdominal tenderness.      Comments: No HSM   Musculoskeletal:      Right lower leg: No edema.      Left lower leg: No edema.   Lymphadenopathy:      Cervical: No cervical adenopathy.   Skin:     General: Skin is warm.      Findings: No erythema or rash.   Neurological:      Mental Status: She is alert and oriented to person, place, and time.      Cranial Nerves: Cranial nerves are intact.      Motor: No tremor.      Coordination: Coordination is intact.      Gait: Gait is intact.   Psychiatric:         Attention and Perception: Attention normal.         Mood and Affect: Mood normal.         Speech: Speech normal.         Behavior: Behavior normal. Behavior is cooperative.         Cognition and Memory: Cognition and memory normal.      Comments: Tearful after attempt at ear lavage, R EAC has wax dislodged and moved less deep in the EAC but still plugging the entire canal and can't be removed with spoon, too painful on one attempt with instrumentation.           Assessment/Plan    Diagnoses and all orders for this visit:    1. Dizziness (Primary)  -     Comprehensive metabolic  panel  -     Urinalysis With Microscopic If Indicated (No Culture) - Urine, Clean Catch  -     Iron and TIBC  -     CBC No Differential  -     Pregnancy, Urine - Urine, Clean Catch  -     fluticasone (Flonase) 50 MCG/ACT nasal spray; Use 2 sprays on each side of nose to open up congestion  Dispense: 18.2 mL; Refill: 1    2. Nausea  -     Comprehensive metabolic panel  -     Pregnancy, Urine - Urine, Clean Catch    3. Bilateral impacted cerumen  -     Ear Cerumen Removal Lavage  -     carbamide peroxide (Debrox) 6.5 % otic solution; Administer 5 drops into both ears 2 (Two) Times a Day. To dissolve wax plugs  Dispense: 22 mL; Refill: 1    4. Rhinitis, unspecified type  -     fluticasone (Flonase) 50 MCG/ACT nasal spray; Use 2 sprays on each side of nose to open up congestion  Dispense: 18.2 mL; Refill: 1         Patient's Body mass index is 22.85 kg/m². BMI is within normal parameters. No follow-up required..    Ear Lavage procedure on the R today was unsuccessful at removing the wax.     MDM  Number of Diagnoses or Management Options  New dx: all new to me  Est dx: none     Amount and/or Complexity of Data Reviewed  Labs: ordered  Records: no  X-rays: no  Other tests: no     Risk of Complications, Morbidity, and/or Mortality  Presenting problems: moderate   Diagnostic procedures: labs  Management options: Rx, pt education, low Na diet    After Visit Summary was completed today for the patient.      Return in about 3 weeks (around 3/16/2021) for recheck ear wax, dizziness.      The following instructions were given to the patient:  -Do the labs today  - Drink lots of water  - Start Flonase nasal spray once a day to open up the blocked ear tubes and decrease the nose congestion; see if that helps the dizziness  - Use a low sodium diet to prevent dizziness and extra fluid behind the ears/inner ear symptoms  - Use the ear wax drops to dissolve the wax in both ears and see if that changes how the ears feel  - We will  send you the results on the labs on [facility].  - Go to ER for acute worsening of dizziness with any associated fever, double vision, uncontrolled vomitting; would need cat scan in that case      Handouts given:  - Earwax Buildup  - Dizziness  - Low Sodium Eating Plan    Results of  Labs:  Normal labs today, not pregnant, UA has high SG of 1.030, asked pt to drink more water, avoid excess caffeine and to stick to the low sodium diet to help the dizziness.  Sent her a note in [employer].      Amelia Mercado M.D.  Family Medicine    2/23/2021

## 2021-03-16 ENCOUNTER — OFFICE VISIT (OUTPATIENT)
Dept: FAMILY MEDICINE CLINIC | Facility: CLINIC | Age: 21
End: 2021-03-16

## 2021-03-16 VITALS
SYSTOLIC BLOOD PRESSURE: 120 MMHG | BODY MASS INDEX: 22.31 KG/M2 | RESPIRATION RATE: 18 BRPM | OXYGEN SATURATION: 99 % | DIASTOLIC BLOOD PRESSURE: 75 MMHG | HEART RATE: 80 BPM | TEMPERATURE: 98.5 F | WEIGHT: 138.8 LBS | HEIGHT: 66 IN

## 2021-03-16 DIAGNOSIS — H61.23 BILATERAL IMPACTED CERUMEN: Primary | ICD-10-CM

## 2021-03-16 DIAGNOSIS — J31.0 RHINITIS, UNSPECIFIED TYPE: ICD-10-CM

## 2021-03-16 DIAGNOSIS — R42 DIZZINESS: ICD-10-CM

## 2021-03-16 PROCEDURE — 69209 REMOVE IMPACTED EAR WAX UNI: CPT | Performed by: FAMILY MEDICINE

## 2021-03-16 PROCEDURE — 99213 OFFICE O/P EST LOW 20 MIN: CPT | Performed by: FAMILY MEDICINE

## 2021-03-16 RX ORDER — FLUTICASONE PROPIONATE 50 MCG
SPRAY, SUSPENSION (ML) NASAL
Qty: 18.2 ML | Refills: 2 | Status: SHIPPED | OUTPATIENT
Start: 2021-03-16 | End: 2022-12-22 | Stop reason: SDUPTHER

## 2021-03-16 NOTE — PROGRESS NOTES
Chief Complaint   Patient presents with   • Follow-up     recheck ears, using ear drops        History:  Yuly Fernandes is a 21 y.o. female who is here today to recheck bilateral cerumen impactions in the ears, has some dizziness which may or may not have been associated, I saw her 2-23-21 the day after it started and did labs ( see below, reviewed them with the pt today).   I recommended low-sodium diet for the dizziness, added Flonase nasal spray, asked her to use over-the-counter ear wax dissolving drops and is going to be rechecked today for the residual wax.  She has normal blood pressure, recent labs were reviewed with her.    Dizziness has resolved.  She is working again.  She has had chronic ear wax and wants to get the rest of the wax out today.    Labs reviewed:   2/23/21 labs:    Pregnancy, Urine - Urine, Clean Catch (02/23/2021 11:04) - negative  CBC No Differential (02/23/2021 11:04) - normal  Iron and TIBC (02/23/2021 11:04)- normal except for Iron Sat 19%, LLN is 20%  Urinalysis With Microscopic If Indicated (No Culture) - Urine, Clean Catch (02/23/2021 11:04) - dry, SG was > 1.030, o/w normal.  Dehydrated status may have caused the acute dizziness at work that day.  Comprehensive metabolic panel (02/23/2021 11:04) - BUN 21 ( ULN 20), Tot Bili 1.3 ( ULN 1.3), elevated BUN/Creat ratio of 27.3 and o/w normal CMP that day.    ROS:  Review of Systems   HENT: Negative for ear discharge.    Gastrointestinal: Negative for nausea.   Neurological: Negative for dizziness and light-headedness.       No Known Allergies    Past Medical History:   Diagnosis Date   • Urinary tract infection        History reviewed. No pertinent surgical history.    Family History   Problem Relation Age of Onset   • Diabetes Maternal Great-Grandfather    • Dementia Maternal Great-Grandfather        Yuly  reports that she has never smoked. She has never used smokeless tobacco. She reports current alcohol use. She  "reports that she does not use drugs.    Outpatient Medications Prior to Visit   Medication Sig Dispense Refill   • carbamide peroxide (Debrox) 6.5 % otic solution Administer 5 drops into both ears 2 (Two) Times a Day. To dissolve wax plugs 22 mL 1   • ibuprofen (ADVIL,MOTRIN) 800 MG tablet Take 1 tablet by mouth Every 8 (Eight) Hours As Needed for Mild Pain . 90 tablet 2   • Isibloom 0.15-30 MG-MCG per tablet Take 1 tablet by mouth Daily.     • fluticasone (Flonase) 50 MCG/ACT nasal spray Use 2 sprays on each side of nose to open up congestion 18.2 mL 1   • ondansetron (ZOFRAN) 4 MG tablet Take 4 mg by mouth Every 8 (Eight) Hours As Needed for Nausea or Vomiting.     • Prenatal Vit-Fe Fumarate-FA (PRENATAL, CLASSIC, VITAMIN) 28-0.8 MG tablet tablet Take 1 tablet by mouth Daily.       No facility-administered medications prior to visit.        OBJECTIVE:  /75 (BP Location: Left arm, Patient Position: Sitting, Cuff Size: Adult)   Pulse 80   Temp 98.5 °F (36.9 °C) (Temporal)   Resp 18   Ht 167.6 cm (66\")   Wt 63 kg (138 lb 12.8 oz)   SpO2 99%   BMI 22.40 kg/m²      Physical Exam  Constitutional:       Comments: Both EACs have impacted brown-medium consistency wax in them on initial exam.   HENT:      Right Ear: Tympanic membrane and ear canal normal. There is no impacted cerumen.      Left Ear: Tympanic membrane and ear canal normal. There is no impacted cerumen.      Ears:      Comments: Ear exam performed after lavage by SARAHI HODGES       Nose: Congestion present.      Mouth/Throat:      Mouth: Mucous membranes are moist.      Pharynx: No oropharyngeal exudate or posterior oropharyngeal erythema.   Eyes:      Conjunctiva/sclera: Conjunctivae normal.   Cardiovascular:      Rate and Rhythm: Normal rate and regular rhythm.   Pulmonary:      Effort: Pulmonary effort is normal.      Breath sounds: Normal breath sounds.   Skin:     General: Skin is warm.      Coloration: Skin is not pale.      Findings: No rash. "   Neurological:      Mental Status: She is alert and oriented to person, place, and time.      Comments: Not dizzy on exam today         Assessment/Plan    Diagnoses and all orders for this visit:    1. Bilateral impacted cerumen (Primary)    2. Dizziness  -     fluticasone (Flonase) 50 MCG/ACT nasal spray; Use 2 sprays on each side of nose to open up congestion; use once a day  Dispense: 18.2 mL; Refill: 2    3. Rhinitis, unspecified type  -     fluticasone (Flonase) 50 MCG/ACT nasal spray; Use 2 sprays on each side of nose to open up congestion; use once a day  Dispense: 18.2 mL; Refill: 2         Patient's Body mass index is 22.4 kg/m². BMI is within normal parameters. No follow-up required..         MDM  Number of Diagnoses or Management Options  New dx:none  Est dx: all - she is doing better and the ear wax is all cleared now after lavage of both ears; I rechecked the ears before she left.  The dizziness is gone.     Amount and/or Complexity of Data Reviewed  Labs: yes  Records: no  X-rays: no  Other tests: no     Risk of Complications, Morbidity, and/or Mortality  Presenting problems: low   Diagnostic procedures: none  Management options: OTC Ear Wax drops and Rx nasal steroid    After Visit Summary was completed today for the patient.      Return for any acute health issues.      The following instructions were given to the patient:  -Continue to use the ear wax drops at least three times a week to keep ahead of the ear wax problem.          Amelia Mercado M.D.  Family Medicine    3/28/2021

## 2021-05-03 DIAGNOSIS — H61.23 BILATERAL IMPACTED CERUMEN: ICD-10-CM

## 2021-05-04 RX ORDER — FAMOTIDINE 20 MG
TABLET ORAL
Qty: 15 ML | Refills: 1 | Status: ON HOLD | OUTPATIENT
Start: 2021-05-04 | End: 2022-03-10

## 2022-03-01 ENCOUNTER — LAB (OUTPATIENT)
Dept: LAB | Facility: HOSPITAL | Age: 22
End: 2022-03-01

## 2022-03-01 ENCOUNTER — TRANSCRIBE ORDERS (OUTPATIENT)
Dept: ADMINISTRATIVE | Facility: HOSPITAL | Age: 22
End: 2022-03-01

## 2022-03-01 DIAGNOSIS — O36.80X0 PREGNANCY WITH INCONCLUSIVE FETAL VIABILITY, SINGLE OR UNSPECIFIED FETUS: ICD-10-CM

## 2022-03-01 DIAGNOSIS — O36.80X0 PREGNANCY WITH INCONCLUSIVE FETAL VIABILITY, SINGLE OR UNSPECIFIED FETUS: Primary | ICD-10-CM

## 2022-03-01 LAB — HCG INTACT+B SERPL-ACNC: 283.3 MIU/ML

## 2022-03-01 PROCEDURE — 84702 CHORIONIC GONADOTROPIN TEST: CPT

## 2022-03-01 PROCEDURE — 36415 COLL VENOUS BLD VENIPUNCTURE: CPT

## 2022-03-10 ENCOUNTER — PREP FOR SURGERY (OUTPATIENT)
Dept: OTHER | Facility: HOSPITAL | Age: 22
End: 2022-03-10

## 2022-03-10 ENCOUNTER — TRANSCRIBE ORDERS (OUTPATIENT)
Dept: ADMINISTRATIVE | Facility: HOSPITAL | Age: 22
End: 2022-03-10

## 2022-03-10 ENCOUNTER — LAB (OUTPATIENT)
Dept: LAB | Facility: HOSPITAL | Age: 22
End: 2022-03-10

## 2022-03-10 ENCOUNTER — HOSPITAL ENCOUNTER (OUTPATIENT)
Facility: HOSPITAL | Age: 22
Setting detail: OBSERVATION
Discharge: HOME | End: 2022-03-11
Attending: OBSTETRICS & GYNECOLOGY | Admitting: OBSTETRICS & GYNECOLOGY

## 2022-03-10 DIAGNOSIS — O02.81 INAPPROPRIATE CHANGE IN QUANTITATIVE HUMAN CHORIONIC GONADOTROPIN (HCG) IN EARLY PREGNANCY: Primary | ICD-10-CM

## 2022-03-10 DIAGNOSIS — O02.81 INAPPROPRIATE CHANGE IN QUANTITATIVE HUMAN CHORIONIC GONADOTROPIN (HCG) IN EARLY PREGNANCY: ICD-10-CM

## 2022-03-10 LAB
ALBUMIN SERPL-MCNC: 4.9 G/DL (ref 3.5–5.2)
ALBUMIN/GLOB SERPL: 1.8 G/DL
ALP SERPL-CCNC: 58 U/L (ref 39–117)
ALT SERPL W P-5'-P-CCNC: 8 U/L (ref 1–33)
ANION GAP SERPL CALCULATED.3IONS-SCNC: 12 MMOL/L (ref 5–15)
AST SERPL-CCNC: 13 U/L (ref 1–32)
BILIRUB SERPL-MCNC: 0.9 MG/DL (ref 0–1.2)
BUN SERPL-MCNC: 19 MG/DL (ref 6–20)
BUN/CREAT SERPL: 33.3 (ref 7–25)
CALCIUM SPEC-SCNC: 9.9 MG/DL (ref 8.6–10.5)
CHLORIDE SERPL-SCNC: 101 MMOL/L (ref 98–107)
CO2 SERPL-SCNC: 25 MMOL/L (ref 22–29)
CREAT SERPL-MCNC: 0.57 MG/DL (ref 0.57–1)
EGFRCR SERPLBLD CKD-EPI 2021: 130.3 ML/MIN/1.73
GLOBULIN UR ELPH-MCNC: 2.7 GM/DL
GLUCOSE SERPL-MCNC: 91 MG/DL (ref 65–99)
HCG INTACT+B SERPL-ACNC: 395.3 MIU/ML
POTASSIUM SERPL-SCNC: 4.1 MMOL/L (ref 3.5–5.2)
PROT SERPL-MCNC: 7.6 G/DL (ref 6–8.5)
SARS-COV-2 RNA PNL SPEC NAA+PROBE: NOT DETECTED
SODIUM SERPL-SCNC: 138 MMOL/L (ref 136–145)

## 2022-03-10 PROCEDURE — 80053 COMPREHEN METABOLIC PANEL: CPT | Performed by: OBSTETRICS & GYNECOLOGY

## 2022-03-10 PROCEDURE — 96372 THER/PROPH/DIAG INJ SC/IM: CPT

## 2022-03-10 PROCEDURE — 87635 SARS-COV-2 COVID-19 AMP PRB: CPT | Performed by: OBSTETRICS & GYNECOLOGY

## 2022-03-10 PROCEDURE — 96360 HYDRATION IV INFUSION INIT: CPT

## 2022-03-10 PROCEDURE — 84702 CHORIONIC GONADOTROPIN TEST: CPT

## 2022-03-10 PROCEDURE — 25010000002 ONDANSETRON PER 1 MG: Performed by: OBSTETRICS & GYNECOLOGY

## 2022-03-10 PROCEDURE — C9803 HOPD COVID-19 SPEC COLLECT: HCPCS

## 2022-03-10 PROCEDURE — G0378 HOSPITAL OBSERVATION PER HR: HCPCS

## 2022-03-10 PROCEDURE — 96361 HYDRATE IV INFUSION ADD-ON: CPT

## 2022-03-10 PROCEDURE — 25010000002 METHOTREXATE PF 100 MG/4ML SOLUTION: Performed by: OBSTETRICS & GYNECOLOGY

## 2022-03-10 PROCEDURE — 36415 COLL VENOUS BLD VENIPUNCTURE: CPT

## 2022-03-10 RX ORDER — ACETAMINOPHEN 325 MG/1
650 TABLET ORAL EVERY 4 HOURS PRN
Status: DISCONTINUED | OUTPATIENT
Start: 2022-03-10 | End: 2022-03-11 | Stop reason: HOSPADM

## 2022-03-10 RX ORDER — METHOTREXATE 25 MG/ML
85 INJECTION, SOLUTION INTRA-ARTERIAL; INTRAMUSCULAR; INTRATHECAL; INTRAVENOUS ONCE
Status: COMPLETED | OUTPATIENT
Start: 2022-03-10 | End: 2022-03-10

## 2022-03-10 RX ORDER — SODIUM CHLORIDE 0.9 % (FLUSH) 0.9 %
10 SYRINGE (ML) INJECTION EVERY 12 HOURS SCHEDULED
Status: CANCELLED | OUTPATIENT
Start: 2022-03-10

## 2022-03-10 RX ORDER — SODIUM CHLORIDE 0.9 % (FLUSH) 0.9 %
10 SYRINGE (ML) INJECTION AS NEEDED
Status: CANCELLED | OUTPATIENT
Start: 2022-03-10

## 2022-03-10 RX ORDER — ACETAMINOPHEN 325 MG/1
650 TABLET ORAL EVERY 4 HOURS PRN
Status: CANCELLED | OUTPATIENT
Start: 2022-03-10

## 2022-03-10 RX ORDER — DEXTROSE, SODIUM CHLORIDE, AND POTASSIUM CHLORIDE 5; .45; .15 G/100ML; G/100ML; G/100ML
125 INJECTION INTRAVENOUS CONTINUOUS
Status: CANCELLED | OUTPATIENT
Start: 2022-03-10

## 2022-03-10 RX ORDER — SODIUM CHLORIDE 0.9 % (FLUSH) 0.9 %
10 SYRINGE (ML) INJECTION AS NEEDED
Status: DISCONTINUED | OUTPATIENT
Start: 2022-03-10 | End: 2022-03-11 | Stop reason: HOSPADM

## 2022-03-10 RX ORDER — DEXTROSE, SODIUM CHLORIDE, AND POTASSIUM CHLORIDE 5; .45; .15 G/100ML; G/100ML; G/100ML
125 INJECTION INTRAVENOUS CONTINUOUS
Status: DISCONTINUED | OUTPATIENT
Start: 2022-03-10 | End: 2022-03-11 | Stop reason: HOSPADM

## 2022-03-10 RX ORDER — SODIUM CHLORIDE 0.9 % (FLUSH) 0.9 %
10 SYRINGE (ML) INJECTION EVERY 12 HOURS SCHEDULED
Status: DISCONTINUED | OUTPATIENT
Start: 2022-03-10 | End: 2022-03-11 | Stop reason: HOSPADM

## 2022-03-10 RX ORDER — METHOTREXATE 25 MG/ML
85 INJECTION, SOLUTION INTRA-ARTERIAL; INTRAMUSCULAR; INTRATHECAL; INTRAVENOUS ONCE
Status: CANCELLED | OUTPATIENT
Start: 2022-03-10 | End: 2022-03-10

## 2022-03-10 RX ADMIN — ONDANSETRON 6 MG: 2 INJECTION INTRAMUSCULAR; INTRAVENOUS at 19:14

## 2022-03-10 RX ADMIN — POTASSIUM CHLORIDE, DEXTROSE MONOHYDRATE AND SODIUM CHLORIDE 125 ML/HR: 150; 5; 450 INJECTION, SOLUTION INTRAVENOUS at 17:38

## 2022-03-10 RX ADMIN — ACETAMINOPHEN 650 MG: 325 TABLET ORAL at 17:37

## 2022-03-10 RX ADMIN — METHOTREXATE 42.5 MG: 25 SOLUTION INTRA-ARTERIAL; INTRAMUSCULAR; INTRATHECAL; INTRAVENOUS at 17:38

## 2022-03-11 VITALS
SYSTOLIC BLOOD PRESSURE: 109 MMHG | TEMPERATURE: 98.2 F | RESPIRATION RATE: 18 BRPM | DIASTOLIC BLOOD PRESSURE: 56 MMHG | WEIGHT: 130.1 LBS | BODY MASS INDEX: 20.91 KG/M2 | HEIGHT: 66 IN | OXYGEN SATURATION: 99 % | HEART RATE: 64 BPM

## 2022-03-11 PROCEDURE — 96361 HYDRATE IV INFUSION ADD-ON: CPT

## 2022-03-11 PROCEDURE — G0378 HOSPITAL OBSERVATION PER HR: HCPCS

## 2022-03-11 RX ADMIN — POTASSIUM CHLORIDE, DEXTROSE MONOHYDRATE AND SODIUM CHLORIDE 125 ML/HR: 150; 5; 450 INJECTION, SOLUTION INTRAVENOUS at 02:21

## 2022-03-14 ENCOUNTER — TRANSCRIBE ORDERS (OUTPATIENT)
Dept: ADMINISTRATIVE | Facility: HOSPITAL | Age: 22
End: 2022-03-14

## 2022-03-14 ENCOUNTER — LAB (OUTPATIENT)
Dept: LAB | Facility: HOSPITAL | Age: 22
End: 2022-03-14

## 2022-03-14 DIAGNOSIS — O02.81 INAPPROPRIATE CHANGE IN QUANTITATIVE HUMAN CHORIONIC GONADOTROPIN (HCG) IN EARLY PREGNANCY: Primary | ICD-10-CM

## 2022-03-14 LAB — HCG INTACT+B SERPL-ACNC: 104 MIU/ML

## 2022-03-14 PROCEDURE — 36415 COLL VENOUS BLD VENIPUNCTURE: CPT | Performed by: OBSTETRICS & GYNECOLOGY

## 2022-03-14 PROCEDURE — 84702 CHORIONIC GONADOTROPIN TEST: CPT | Performed by: OBSTETRICS & GYNECOLOGY

## 2022-03-17 ENCOUNTER — TRANSCRIBE ORDERS (OUTPATIENT)
Dept: ADMINISTRATIVE | Facility: HOSPITAL | Age: 22
End: 2022-03-17

## 2022-03-17 ENCOUNTER — LAB (OUTPATIENT)
Dept: LAB | Facility: HOSPITAL | Age: 22
End: 2022-03-17

## 2022-03-17 DIAGNOSIS — O02.81 INAPPROPRIATE CHANGE IN QUANTITATIVE HUMAN CHORIONIC GONADOTROPIN (HCG) IN EARLY PREGNANCY: ICD-10-CM

## 2022-03-17 DIAGNOSIS — O02.81 INAPPROPRIATE CHANGE IN QUANTITATIVE HUMAN CHORIONIC GONADOTROPIN (HCG) IN EARLY PREGNANCY: Primary | ICD-10-CM

## 2022-03-17 LAB — HCG INTACT+B SERPL-ACNC: 29.6 MIU/ML

## 2022-03-17 PROCEDURE — 84702 CHORIONIC GONADOTROPIN TEST: CPT

## 2022-03-17 PROCEDURE — 36415 COLL VENOUS BLD VENIPUNCTURE: CPT

## 2022-05-24 ENCOUNTER — APPOINTMENT (OUTPATIENT)
Dept: ULTRASOUND IMAGING | Facility: HOSPITAL | Age: 22
End: 2022-05-24

## 2022-05-24 ENCOUNTER — HOSPITAL ENCOUNTER (OUTPATIENT)
Facility: HOSPITAL | Age: 22
Setting detail: OBSERVATION
Discharge: HOME | End: 2022-05-26
Attending: OBSTETRICS & GYNECOLOGY | Admitting: OBSTETRICS & GYNECOLOGY

## 2022-05-24 ENCOUNTER — PREP FOR SURGERY (OUTPATIENT)
Dept: OTHER | Facility: HOSPITAL | Age: 22
End: 2022-05-24

## 2022-05-24 DIAGNOSIS — O20.9 VAGINAL BLEEDING AFFECTING EARLY PREGNANCY: Primary | ICD-10-CM

## 2022-05-24 DIAGNOSIS — O20.9 VAGINAL BLEEDING AFFECTING EARLY PREGNANCY: ICD-10-CM

## 2022-05-24 LAB
ALBUMIN SERPL-MCNC: 4.4 G/DL (ref 3.5–5.2)
ALBUMIN/GLOB SERPL: 1.9 G/DL
ALP SERPL-CCNC: 55 U/L (ref 39–117)
ALT SERPL W P-5'-P-CCNC: 9 U/L (ref 1–33)
ANION GAP SERPL CALCULATED.3IONS-SCNC: 7 MMOL/L (ref 5–15)
AST SERPL-CCNC: 12 U/L (ref 1–32)
BASOPHILS # BLD AUTO: 0.02 10*3/MM3 (ref 0–0.2)
BASOPHILS NFR BLD AUTO: 0.3 % (ref 0–1.5)
BILIRUB SERPL-MCNC: 1.1 MG/DL (ref 0–1.2)
BUN SERPL-MCNC: 15 MG/DL (ref 6–20)
BUN/CREAT SERPL: 27.8 (ref 7–25)
CALCIUM SPEC-SCNC: 9.5 MG/DL (ref 8.6–10.5)
CHLORIDE SERPL-SCNC: 103 MMOL/L (ref 98–107)
CO2 SERPL-SCNC: 29 MMOL/L (ref 22–29)
CREAT SERPL-MCNC: 0.54 MG/DL (ref 0.57–1)
DEPRECATED RDW RBC AUTO: 43.2 FL (ref 37–54)
EGFRCR SERPLBLD CKD-EPI 2021: 132 ML/MIN/1.73
EOSINOPHIL # BLD AUTO: 0.06 10*3/MM3 (ref 0–0.4)
EOSINOPHIL NFR BLD AUTO: 0.9 % (ref 0.3–6.2)
ERYTHROCYTE [DISTWIDTH] IN BLOOD BY AUTOMATED COUNT: 12.4 % (ref 12.3–15.4)
GLOBULIN UR ELPH-MCNC: 2.3 GM/DL
GLUCOSE SERPL-MCNC: 107 MG/DL (ref 65–99)
HCG INTACT+B SERPL-ACNC: 796.4 MIU/ML
HCT VFR BLD AUTO: 34.2 % (ref 34–46.6)
HGB BLD-MCNC: 11.6 G/DL (ref 12–15.9)
IMM GRANULOCYTES # BLD AUTO: 0.01 10*3/MM3 (ref 0–0.05)
IMM GRANULOCYTES NFR BLD AUTO: 0.1 % (ref 0–0.5)
LYMPHOCYTES # BLD AUTO: 2.24 10*3/MM3 (ref 0.7–3.1)
LYMPHOCYTES NFR BLD AUTO: 33.4 % (ref 19.6–45.3)
MCH RBC QN AUTO: 31.7 PG (ref 26.6–33)
MCHC RBC AUTO-ENTMCNC: 33.9 G/DL (ref 31.5–35.7)
MCV RBC AUTO: 93.4 FL (ref 79–97)
MONOCYTES # BLD AUTO: 0.48 10*3/MM3 (ref 0.1–0.9)
MONOCYTES NFR BLD AUTO: 7.2 % (ref 5–12)
NEUTROPHILS NFR BLD AUTO: 3.89 10*3/MM3 (ref 1.7–7)
NEUTROPHILS NFR BLD AUTO: 58.1 % (ref 42.7–76)
NRBC BLD AUTO-RTO: 0 /100 WBC (ref 0–0.2)
PLATELET # BLD AUTO: 178 10*3/MM3 (ref 140–450)
PMV BLD AUTO: 11.5 FL (ref 6–12)
POTASSIUM SERPL-SCNC: 3.7 MMOL/L (ref 3.5–5.2)
PROT SERPL-MCNC: 6.7 G/DL (ref 6–8.5)
RBC # BLD AUTO: 3.66 10*6/MM3 (ref 3.77–5.28)
SARS-COV-2 RNA PNL SPEC NAA+PROBE: NOT DETECTED
SODIUM SERPL-SCNC: 139 MMOL/L (ref 136–145)
WBC NRBC COR # BLD: 6.7 10*3/MM3 (ref 3.4–10.8)

## 2022-05-24 PROCEDURE — C9803 HOPD COVID-19 SPEC COLLECT: HCPCS

## 2022-05-24 PROCEDURE — 76856 US EXAM PELVIC COMPLETE: CPT

## 2022-05-24 PROCEDURE — 84702 CHORIONIC GONADOTROPIN TEST: CPT | Performed by: OBSTETRICS & GYNECOLOGY

## 2022-05-24 PROCEDURE — G0378 HOSPITAL OBSERVATION PER HR: HCPCS

## 2022-05-24 PROCEDURE — 85025 COMPLETE CBC W/AUTO DIFF WBC: CPT | Performed by: OBSTETRICS & GYNECOLOGY

## 2022-05-24 PROCEDURE — 80053 COMPREHEN METABOLIC PANEL: CPT | Performed by: OBSTETRICS & GYNECOLOGY

## 2022-05-24 PROCEDURE — G0379 DIRECT REFER HOSPITAL OBSERV: HCPCS

## 2022-05-24 PROCEDURE — 76830 TRANSVAGINAL US NON-OB: CPT

## 2022-05-24 PROCEDURE — 87635 SARS-COV-2 COVID-19 AMP PRB: CPT | Performed by: OBSTETRICS & GYNECOLOGY

## 2022-05-24 RX ORDER — SODIUM CHLORIDE 0.9 % (FLUSH) 0.9 %
10 SYRINGE (ML) INJECTION AS NEEDED
Status: CANCELLED | OUTPATIENT
Start: 2022-05-24

## 2022-05-24 RX ORDER — ACETAMINOPHEN 325 MG/1
650 TABLET ORAL EVERY 4 HOURS PRN
Status: DISCONTINUED | OUTPATIENT
Start: 2022-05-24 | End: 2022-05-26 | Stop reason: HOSPADM

## 2022-05-24 RX ORDER — SODIUM CHLORIDE 0.9 % (FLUSH) 0.9 %
10 SYRINGE (ML) INJECTION EVERY 12 HOURS SCHEDULED
Status: DISCONTINUED | OUTPATIENT
Start: 2022-05-24 | End: 2022-05-26 | Stop reason: HOSPADM

## 2022-05-24 RX ORDER — ONDANSETRON 2 MG/ML
4 INJECTION INTRAMUSCULAR; INTRAVENOUS EVERY 6 HOURS PRN
Status: DISCONTINUED | OUTPATIENT
Start: 2022-05-24 | End: 2022-05-26 | Stop reason: HOSPADM

## 2022-05-24 RX ORDER — SODIUM CHLORIDE 0.9 % (FLUSH) 0.9 %
10 SYRINGE (ML) INJECTION AS NEEDED
Status: DISCONTINUED | OUTPATIENT
Start: 2022-05-24 | End: 2022-05-26 | Stop reason: HOSPADM

## 2022-05-24 RX ORDER — DEXTROSE, SODIUM CHLORIDE, AND POTASSIUM CHLORIDE 5; .45; .15 G/100ML; G/100ML; G/100ML
125 INJECTION INTRAVENOUS CONTINUOUS
Status: DISCONTINUED | OUTPATIENT
Start: 2022-05-24 | End: 2022-05-26 | Stop reason: HOSPADM

## 2022-05-24 RX ORDER — SODIUM CHLORIDE 0.9 % (FLUSH) 0.9 %
10 SYRINGE (ML) INJECTION EVERY 12 HOURS SCHEDULED
Status: CANCELLED | OUTPATIENT
Start: 2022-05-24

## 2022-05-24 RX ORDER — DEXTROSE, SODIUM CHLORIDE, AND POTASSIUM CHLORIDE 5; .45; .15 G/100ML; G/100ML; G/100ML
125 INJECTION INTRAVENOUS CONTINUOUS
Status: CANCELLED | OUTPATIENT
Start: 2022-05-24

## 2022-05-24 RX ORDER — ONDANSETRON 2 MG/ML
4 INJECTION INTRAMUSCULAR; INTRAVENOUS EVERY 6 HOURS PRN
Status: CANCELLED | OUTPATIENT
Start: 2022-05-24

## 2022-05-24 RX ORDER — ACETAMINOPHEN 325 MG/1
650 TABLET ORAL EVERY 4 HOURS PRN
Status: CANCELLED | OUTPATIENT
Start: 2022-05-24

## 2022-05-24 RX ORDER — ONDANSETRON 4 MG/1
4 TABLET, FILM COATED ORAL EVERY 6 HOURS PRN
Status: DISCONTINUED | OUTPATIENT
Start: 2022-05-24 | End: 2022-05-26 | Stop reason: HOSPADM

## 2022-05-24 RX ORDER — ONDANSETRON 4 MG/1
4 TABLET, FILM COATED ORAL EVERY 6 HOURS PRN
Status: CANCELLED | OUTPATIENT
Start: 2022-05-24

## 2022-05-24 RX ADMIN — ACETAMINOPHEN 650 MG: 325 TABLET ORAL at 19:36

## 2022-05-24 RX ADMIN — Medication 10 ML: at 17:36

## 2022-05-24 RX ADMIN — POTASSIUM CHLORIDE, DEXTROSE MONOHYDRATE AND SODIUM CHLORIDE 125 ML/HR: 150; 5; 450 INJECTION, SOLUTION INTRAVENOUS at 17:31

## 2022-05-24 NOTE — NURSING NOTE
"Received patient from the doctor office with possible ectopic pregnancy. Patient stated that she would be 5/2weeks today, c/o occasional abd cramping with dark brownish discharge, which both started this past Sunday. Pt NPO presently, a complete pelvis US is in progress, please call results to Dr. Olson. Patient is anxious related to the \"unknown\"she stated.  will be staying the night with patient.  "

## 2022-05-25 ENCOUNTER — APPOINTMENT (OUTPATIENT)
Dept: ULTRASOUND IMAGING | Facility: HOSPITAL | Age: 22
End: 2022-05-25

## 2022-05-25 LAB
BASOPHILS # BLD AUTO: 0.03 10*3/MM3 (ref 0–0.2)
BASOPHILS NFR BLD AUTO: 0.6 % (ref 0–1.5)
DEPRECATED RDW RBC AUTO: 43.8 FL (ref 37–54)
EOSINOPHIL # BLD AUTO: 0.1 10*3/MM3 (ref 0–0.4)
EOSINOPHIL NFR BLD AUTO: 1.9 % (ref 0.3–6.2)
ERYTHROCYTE [DISTWIDTH] IN BLOOD BY AUTOMATED COUNT: 12.6 % (ref 12.3–15.4)
HCG INTACT+B SERPL-ACNC: 832.5 MIU/ML
HCT VFR BLD AUTO: 36.2 % (ref 34–46.6)
HGB BLD-MCNC: 12.3 G/DL (ref 12–15.9)
IMM GRANULOCYTES # BLD AUTO: 0.01 10*3/MM3 (ref 0–0.05)
IMM GRANULOCYTES NFR BLD AUTO: 0.2 % (ref 0–0.5)
LYMPHOCYTES # BLD AUTO: 1.91 10*3/MM3 (ref 0.7–3.1)
LYMPHOCYTES NFR BLD AUTO: 37 % (ref 19.6–45.3)
MCH RBC QN AUTO: 32.2 PG (ref 26.6–33)
MCHC RBC AUTO-ENTMCNC: 34 G/DL (ref 31.5–35.7)
MCV RBC AUTO: 94.8 FL (ref 79–97)
MONOCYTES # BLD AUTO: 0.33 10*3/MM3 (ref 0.1–0.9)
MONOCYTES NFR BLD AUTO: 6.4 % (ref 5–12)
NEUTROPHILS NFR BLD AUTO: 2.78 10*3/MM3 (ref 1.7–7)
NEUTROPHILS NFR BLD AUTO: 53.9 % (ref 42.7–76)
NRBC BLD AUTO-RTO: 0 /100 WBC (ref 0–0.2)
PLATELET # BLD AUTO: 172 10*3/MM3 (ref 140–450)
PMV BLD AUTO: 11.9 FL (ref 6–12)
RBC # BLD AUTO: 3.82 10*6/MM3 (ref 3.77–5.28)
WBC NRBC COR # BLD: 5.16 10*3/MM3 (ref 3.4–10.8)

## 2022-05-25 PROCEDURE — 25010000002 ONDANSETRON PER 1 MG: Performed by: OBSTETRICS & GYNECOLOGY

## 2022-05-25 PROCEDURE — 85025 COMPLETE CBC W/AUTO DIFF WBC: CPT | Performed by: OBSTETRICS & GYNECOLOGY

## 2022-05-25 PROCEDURE — 76856 US EXAM PELVIC COMPLETE: CPT

## 2022-05-25 PROCEDURE — 96374 THER/PROPH/DIAG INJ IV PUSH: CPT

## 2022-05-25 PROCEDURE — 84702 CHORIONIC GONADOTROPIN TEST: CPT | Performed by: OBSTETRICS & GYNECOLOGY

## 2022-05-25 PROCEDURE — 96372 THER/PROPH/DIAG INJ SC/IM: CPT

## 2022-05-25 PROCEDURE — 25010000002 METHOTREXATE PF 100 MG/4ML SOLUTION: Performed by: OBSTETRICS & GYNECOLOGY

## 2022-05-25 PROCEDURE — 76830 TRANSVAGINAL US NON-OB: CPT

## 2022-05-25 PROCEDURE — G0378 HOSPITAL OBSERVATION PER HR: HCPCS

## 2022-05-25 RX ORDER — METHOTREXATE 25 MG/ML
85 INJECTION, SOLUTION INTRA-ARTERIAL; INTRAMUSCULAR; INTRATHECAL; INTRAVENOUS ONCE
Status: COMPLETED | OUTPATIENT
Start: 2022-05-25 | End: 2022-05-25

## 2022-05-25 RX ADMIN — ACETAMINOPHEN 650 MG: 325 TABLET ORAL at 15:50

## 2022-05-25 RX ADMIN — ACETAMINOPHEN 650 MG: 325 TABLET ORAL at 11:46

## 2022-05-25 RX ADMIN — ONDANSETRON HYDROCHLORIDE 4 MG: 2 SOLUTION INTRAMUSCULAR; INTRAVENOUS at 19:29

## 2022-05-25 RX ADMIN — ACETAMINOPHEN 650 MG: 325 TABLET ORAL at 02:10

## 2022-05-25 RX ADMIN — METHOTREXATE 85 MG: 25 SOLUTION INTRA-ARTERIAL; INTRAMUSCULAR; INTRATHECAL; INTRAVENOUS at 11:36

## 2022-05-25 RX ADMIN — POTASSIUM CHLORIDE, DEXTROSE MONOHYDRATE AND SODIUM CHLORIDE 125 ML/HR: 150; 5; 450 INJECTION, SOLUTION INTRAVENOUS at 02:04

## 2022-05-25 RX ADMIN — Medication 10 ML: at 09:00

## 2022-05-25 RX ADMIN — POTASSIUM CHLORIDE, DEXTROSE MONOHYDRATE AND SODIUM CHLORIDE 125 ML/HR: 150; 5; 450 INJECTION, SOLUTION INTRAVENOUS at 22:46

## 2022-05-25 NOTE — PLAN OF CARE
Problem: Adult Inpatient Plan of Care  Goal: Plan of Care Review  5/25/2022 0604 by Pari Tovar, RN  Outcome: Ongoing, Progressing  Flowsheets (Taken 5/25/2022 0604)  Progress: improving  Plan of Care Reviewed With: patient  Outcome Evaluation: VSS. Intermittent c/o headache and abdominal cramping resolved with prn tylenol. Ambulating and adequate UOP. Emotional support given. Scant, brown lochia noted on pad. No s/s increased pain or bleeding. Will continue to monitor.  has remained at bedside.  5/25/2022 0602 by Pari Tovar, RN  Outcome: Ongoing, Progressing  Flowsheets (Taken 5/25/2022 0558)  Progress: improving  Plan of Care Reviewed With: patient  Outcome Evaluation: VSS.   Goal Outcome Evaluation:  Plan of Care Reviewed With: patient        Progress: improving  Outcome Evaluation: VSS. Intermittent c/o headache and abdominal cramping resolved with prn tylenol. Ambulating and adequate UOP. Emotional support given. Scant, brown lochia noted on pad. No s/s increased pain or bleeding. Will continue to monitor.  has remained at bedside.

## 2022-05-25 NOTE — PLAN OF CARE
Goal Outcome Evaluation:vss, pain tolerated with PO tylenol.  Ambulating in room. Plan for d/c 5/26

## 2022-05-26 VITALS
DIASTOLIC BLOOD PRESSURE: 59 MMHG | HEART RATE: 72 BPM | OXYGEN SATURATION: 100 % | RESPIRATION RATE: 16 BRPM | TEMPERATURE: 98.4 F | HEIGHT: 66 IN | WEIGHT: 136.8 LBS | SYSTOLIC BLOOD PRESSURE: 107 MMHG | BODY MASS INDEX: 21.98 KG/M2

## 2022-05-26 PROCEDURE — G0378 HOSPITAL OBSERVATION PER HR: HCPCS

## 2022-05-26 RX ADMIN — POTASSIUM CHLORIDE, DEXTROSE MONOHYDRATE AND SODIUM CHLORIDE 125 ML/HR: 150; 5; 450 INJECTION, SOLUTION INTRAVENOUS at 07:10

## 2022-05-26 RX ADMIN — ACETAMINOPHEN 650 MG: 325 TABLET ORAL at 12:38

## 2022-05-26 NOTE — PROGRESS NOTES
"[facility]  Obstetric Progress Note  Late Entry - pt seen twice 5/25    Subjective     Patient:    Patient admitted from office last evening for vaginal bleeding in early pregnancy and no intrauterine pregnancy with projected hcg >2000. Spoke with patient on phone last evening to discuss results with revealed what looked to be a collapsing sac in the uterus and hcg 790+ vs 2000, consistent with probable miscarriage per radiologist. Originally planned on discharging patient after repeat hcg and ultrasound this morning, expecting falling hcg when I saw her at 730. By 11am this morning results revealed an hcg of over 800 and questionable area in right adnexa. Went back by patient room and had a long discussion with patient and spouse about unexpected findings, and possibility of ectopic, and definite abnormal gestation due to abnormal rise in hcg levels recommending methotrexate. Both agreed     Objective     Vital Signs Range for the last 24 hours  Temp:  [98.2 °F (36.8 °C)-98.4 °F (36.9 °C)] 98.4 °F (36.9 °C)   Temp src: Oral   BP: (101-107)/(48-59) 107/59   Heart Rate:  [72-87] 72   Resp:  [16] 16   SpO2:  [99 %-100 %] 100 %       Device (Oxygen Therapy): room air           Flowsheet Rows    Flowsheet Row First Filed Value   Admission Height 167.6 cm (66\") Documented at 05/24/2022 1653   Admission Weight 62.1 kg (136 lb 12.8 oz) Documented at 05/24/2022 1653          Intake/Output last 24 hours:      Intake/Output Summary (Last 24 hours) at 5/26/2022 0837  Last data filed at 5/26/2022 0720  Gross per 24 hour   Intake 1401.73 ml   Output 1400 ml   Net 1.73 ml       Intake/Output this shift:    I/O this shift:  In: 109 [I.V.:109]  Out: 900 [Urine:900]    Physical Exam:  General: Patient is comfortable   Abdomen Abdominal exam: soft, nontender, nondistended, no masses or organomegaly.           Assessment & Plan       Vaginal bleeding affecting early pregnancy        Assessment:  1. Questionable right ectopic  2.  " Vaginal bleeding  3.  Abnormal OB ultrasound/hcg    Plan:  1. Methotrexate  4.  All questions have been answered.        Analy Olson MD  5/26/2022  08:37 CDT

## 2022-05-26 NOTE — DISCHARGE SUMMARY
Discharge Summary     Jarred Fernaneds  : 2000  MRN: [id number]  [facility]: [id number]    Date of Admission: 2022   Date of Discharge:  2022   Physician: Analy Olson MD       Admission Diagnosis: 1. Vaginal bleeding affecting early pregnancy [O20.9]   Discharge Diagnosis: 2. Vaginal bleeding affecting early pregnancy [O20.9]       Procedures: Methotrexate     Hospital Course  Patient is a 22 y.o.  who at Unknown was admitted forVaginal bleeding affecting early pregnancy [O20.9] .  Her hosptial course was without complications for Active Problems:    Vaginal bleeding affecting early pregnancy    Assessment: Probable ectopic  Plan: Follow up after methotrexate        Discharge Medications      Continue These Medications      Instructions Start Date   fluticasone 50 MCG/ACT nasal spray  Commonly known as: Flonase   Use 2 sprays on each side of nose to open up congestion; use once a day      ondansetron 4 MG tablet  Commonly known as: ZOFRAN   4 mg, Oral, Every 8 Hours PRN      prenatal (CLASSIC) vitamin  tablet  Generic drug: prenatal vitamin   1 tablet, Oral, Daily         Stop These Medications    ibuprofen 800 MG tablet  Commonly known as: ADVIL,MOTRIN         .    Discharge labs  Lab Results   Component Value Date    WBC 5.16 2022    HGB 12.3 2022    HCT 36.2 2022     2022       Discharge Medications     Discharge Medications      Continue These Medications      Instructions Start Date   fluticasone 50 MCG/ACT nasal spray  Commonly known as: Flonase   Use 2 sprays on each side of nose to open up congestion; use once a day      ondansetron 4 MG tablet  Commonly known as: ZOFRAN   4 mg, Oral, Every 8 Hours PRN      prenatal (CLASSIC) vitamin  tablet  Generic drug: prenatal vitamin   1 tablet, Oral, Daily         Stop These Medications    ibuprofen 800 MG tablet  Commonly known as: ADVIL,MOTRIN            Discharge Disposition Home or Self Care    Condition on Discharge: good   Follow-up: 1 week with MD Analy Trammell MD  5/26/2022

## 2022-05-26 NOTE — PROGRESS NOTES
Patient received methotrexate yesterday morning.    No pain or complaints this am.  OK for discharge    Analy Olson MD  5/26/2022  8:39 CST

## 2022-05-26 NOTE — PLAN OF CARE
Problem: Adult Inpatient Plan of Care  Goal: Plan of Care Review  Outcome: Ongoing, Progressing  Flowsheets (Taken 5/26/2022 0616)  Progress: improving  Plan of Care Reviewed With: patient  Outcome Evaluation: VSS. Pt reported no pain throughout shift and denied needing any pain medication when asked. Up ad chary and ambulating. No s/s increased pain or bleeding. Adequate UOP. Pt has rested without interruption for most of shift.  has remained at bedside.   Goal Outcome Evaluation:  Plan of Care Reviewed With: patient        Progress: improving  Outcome Evaluation: VSS. Pt reported no pain throughout shift and denied needing any pain medication when asked. Up ad chary and ambulating. No s/s increased pain or bleeding. Adequate UOP. Pt has rested without interruption for most of shift.  has remained at bedside.

## 2022-05-29 ENCOUNTER — TRANSCRIBE ORDERS (OUTPATIENT)
Dept: ADMINISTRATIVE | Facility: HOSPITAL | Age: 22
End: 2022-05-29

## 2022-05-29 ENCOUNTER — LAB (OUTPATIENT)
Dept: LAB | Facility: HOSPITAL | Age: 22
End: 2022-05-29

## 2022-05-29 DIAGNOSIS — O00.90 ECTOPIC PREGNANCY WITHOUT INTRAUTERINE PREGNANCY, UNSPECIFIED LOCATION: Primary | ICD-10-CM

## 2022-05-29 LAB — HCG INTACT+B SERPL-ACNC: 933.3 MIU/ML

## 2022-05-29 PROCEDURE — 36415 COLL VENOUS BLD VENIPUNCTURE: CPT | Performed by: OBSTETRICS & GYNECOLOGY

## 2022-05-29 PROCEDURE — 84702 CHORIONIC GONADOTROPIN TEST: CPT | Performed by: OBSTETRICS & GYNECOLOGY

## 2022-06-01 ENCOUNTER — LAB (OUTPATIENT)
Dept: LAB | Facility: HOSPITAL | Age: 22
End: 2022-06-01

## 2022-06-01 ENCOUNTER — TRANSCRIBE ORDERS (OUTPATIENT)
Dept: ADMINISTRATIVE | Facility: HOSPITAL | Age: 22
End: 2022-06-01

## 2022-06-01 DIAGNOSIS — O00.90 ECTOPIC PREGNANCY WITHOUT INTRAUTERINE PREGNANCY, UNSPECIFIED LOCATION: Primary | ICD-10-CM

## 2022-06-01 DIAGNOSIS — O00.90 ECTOPIC PREGNANCY WITHOUT INTRAUTERINE PREGNANCY, UNSPECIFIED LOCATION: ICD-10-CM

## 2022-06-01 LAB — HCG INTACT+B SERPL-ACNC: 478 MIU/ML

## 2022-06-01 PROCEDURE — 36415 COLL VENOUS BLD VENIPUNCTURE: CPT

## 2022-06-01 PROCEDURE — 84702 CHORIONIC GONADOTROPIN TEST: CPT

## 2022-06-09 ENCOUNTER — INFUSION (OUTPATIENT)
Dept: ONCOLOGY | Facility: HOSPITAL | Age: 22
End: 2022-06-09

## 2022-06-09 VITALS
SYSTOLIC BLOOD PRESSURE: 123 MMHG | HEART RATE: 87 BPM | RESPIRATION RATE: 18 BRPM | DIASTOLIC BLOOD PRESSURE: 70 MMHG | BODY MASS INDEX: 22.56 KG/M2 | OXYGEN SATURATION: 100 % | TEMPERATURE: 98 F | HEIGHT: 66 IN | WEIGHT: 140.4 LBS

## 2022-06-09 DIAGNOSIS — O00.90 ECTOPIC PREGNANCY WITHOUT INTRAUTERINE PREGNANCY, UNSPECIFIED LOCATION: Primary | ICD-10-CM

## 2022-06-09 PROCEDURE — 25010000002 METHOTREXATE PF 100 MG/4ML SOLUTION: Performed by: OBSTETRICS & GYNECOLOGY

## 2022-06-09 PROCEDURE — 96401 CHEMO ANTI-NEOPL SQ/IM: CPT

## 2022-06-09 RX ORDER — METHOTREXATE 25 MG/ML
85 INJECTION INTRA-ARTERIAL; INTRAMUSCULAR; INTRATHECAL; INTRAVENOUS ONCE
Status: CANCELLED | OUTPATIENT
Start: 2022-06-09

## 2022-06-09 RX ORDER — METHOTREXATE 25 MG/ML
85 INJECTION INTRA-ARTERIAL; INTRAMUSCULAR; INTRATHECAL; INTRAVENOUS ONCE
Status: COMPLETED | OUTPATIENT
Start: 2022-06-09 | End: 2022-06-09

## 2022-06-09 RX ADMIN — METHOTREXATE 42.5 MG: 25 SOLUTION INTRA-ARTERIAL; INTRAMUSCULAR; INTRATHECAL; INTRAVENOUS at 11:33

## 2022-06-13 ENCOUNTER — LAB (OUTPATIENT)
Dept: LAB | Facility: HOSPITAL | Age: 22
End: 2022-06-13

## 2022-06-13 ENCOUNTER — TRANSCRIBE ORDERS (OUTPATIENT)
Dept: ADMINISTRATIVE | Facility: HOSPITAL | Age: 22
End: 2022-06-13

## 2022-06-13 DIAGNOSIS — O00.90 ECTOPIC PREGNANCY WITHOUT INTRAUTERINE PREGNANCY, UNSPECIFIED LOCATION: Primary | ICD-10-CM

## 2022-06-13 DIAGNOSIS — O00.90 ECTOPIC PREGNANCY WITHOUT INTRAUTERINE PREGNANCY, UNSPECIFIED LOCATION: ICD-10-CM

## 2022-06-13 LAB — HCG INTACT+B SERPL-ACNC: 191.6 MIU/ML

## 2022-06-13 PROCEDURE — 36415 COLL VENOUS BLD VENIPUNCTURE: CPT

## 2022-06-13 PROCEDURE — 84702 CHORIONIC GONADOTROPIN TEST: CPT

## 2022-06-20 ENCOUNTER — TRANSCRIBE ORDERS (OUTPATIENT)
Dept: ADMINISTRATIVE | Facility: HOSPITAL | Age: 22
End: 2022-06-20

## 2022-06-20 ENCOUNTER — LAB (OUTPATIENT)
Dept: LAB | Facility: HOSPITAL | Age: 22
End: 2022-06-20

## 2022-06-20 DIAGNOSIS — O00.90 ECTOPIC PREGNANCY WITHOUT INTRAUTERINE PREGNANCY, UNSPECIFIED LOCATION: Primary | ICD-10-CM

## 2022-06-20 DIAGNOSIS — O00.90 ECTOPIC PREGNANCY WITHOUT INTRAUTERINE PREGNANCY, UNSPECIFIED LOCATION: ICD-10-CM

## 2022-06-20 LAB — HCG INTACT+B SERPL-ACNC: 138.4 MIU/ML

## 2022-06-20 PROCEDURE — 36415 COLL VENOUS BLD VENIPUNCTURE: CPT

## 2022-06-20 PROCEDURE — 84702 CHORIONIC GONADOTROPIN TEST: CPT

## 2022-06-27 ENCOUNTER — TRANSCRIBE ORDERS (OUTPATIENT)
Dept: ADMINISTRATIVE | Facility: HOSPITAL | Age: 22
End: 2022-06-27

## 2022-06-27 ENCOUNTER — LAB (OUTPATIENT)
Dept: LAB | Facility: HOSPITAL | Age: 22
End: 2022-06-27

## 2022-06-27 DIAGNOSIS — O00.90 ECTOPIC PREGNANCY WITHOUT INTRAUTERINE PREGNANCY, UNSPECIFIED LOCATION: ICD-10-CM

## 2022-06-27 DIAGNOSIS — O00.90 ECTOPIC PREGNANCY WITHOUT INTRAUTERINE PREGNANCY, UNSPECIFIED LOCATION: Primary | ICD-10-CM

## 2022-06-27 LAB — HCG INTACT+B SERPL-ACNC: 26.88 MIU/ML

## 2022-06-27 PROCEDURE — 36415 COLL VENOUS BLD VENIPUNCTURE: CPT

## 2022-06-27 PROCEDURE — 84702 CHORIONIC GONADOTROPIN TEST: CPT

## 2022-12-07 ENCOUNTER — PATIENT OUTREACH (OUTPATIENT)
Dept: LABOR AND DELIVERY | Facility: HOSPITAL | Age: 22
End: 2022-12-07

## 2022-12-07 ENCOUNTER — REFERRAL TRIAGE (OUTPATIENT)
Dept: LABOR AND DELIVERY | Facility: HOSPITAL | Age: 22
End: 2022-12-07

## 2022-12-07 NOTE — OUTREACH NOTE
[facility]  Enrollment    Current Estimated Gestational Age: 13w4d    Questions/Answers    Flowsheet Row Responses   Would like to participate? Yes          [facility]  Intake    Current Estimated Gestational Age: 13w4d    Questions/Answers    Flowsheet Row Responses   Best Method for Contacting Cell   Do you have a dentist? No   Resources Presently Utilizing: WIC (Women, Infant, Children)   Maternal Warning Signs Provided   Other: Provided          SDOH questionnaire, resource letter and prenatal education sent to client in her mychart.     Lorrie Swanson, RN  Maternity Nurse Navigator    12/7/2022, 15:09 CST          Lorrie Swanson RN  Maternity Nurse Navigator    12/7/2022, 15:09 CST

## 2022-12-07 NOTE — PATIENT INSTRUCTIONS
[facility]    Many moms-to-be feel a bit better from weeks 13 to 28 of their pregnancy. You may not be as tired or nauseated as you were during your first trimester. Your baby is doing a lot of growing during this time, though. You'll typically continue seeing your prenatal provider every four weeks. Most pregnant women feel energetic and healthy during this trimester    You'll undergo a few routine tests during this period of your pregnancy:  Rh testing: Your provider will test your blood's Rhesus (Rh) factor, which is an immune system-related protein most people have in their blood.    Oral glucose challenge test: Between your 24th and 28th weeks of pregnancy, you'll visit a lab and drink a sweet liquid (called Glucola) to test for gestational diabetes. The test takes about an hour. Gestational diabetes is a unique type of blood sugar condition that only develops when you're pregnant.     Ultrasound: This safe test uses sound waves to create images of your baby. It can help detect some medical conditions your baby might be developing, such as cleft lip/cleft palate. However, it can also be a very exciting test because your ultrasound technician may be able to tell you your baby's gender if you want to know.     If 4-D ultrasound is available, you may be able to see your baby moving or sucking his or her thumb.     Amniocentesis: This test gives your doctor or midwife more detailed information about your baby's health. However, not every expectant mom needs this test, which involves drawing a sample of the amniotic fluid surrounding your baby. Your provider might recommend this test if you:    Had concerning screening test results earlier in your pregnancy.    Have been pregnant before with a baby who had medical issues.    Are age 35 or older.    Call your healthcare provider or go to the nearest emergency room if you experience any of these symptoms:  Intense cramping or abdominal pain    A significant decrease in your baby's movement after week 28 (fewer than about 6 to 10 movements in an hour).  Shortness of breath or trouble breathing.  Tightening or pain in your back or lower abdomen that could be contractions.  Feeling of intense pressure in your pelvis or vagina.  Any vaginal bleeding or leaking fluid.    Things to start thinking of:  Feeding plan for your [place]  Choosing a Pediatrician for your     Breastfeeding:   Insurance will pay for one breast pump per pregnancy. You can check with your insurance for coverage limits and to order through their preferred company or you can use any of the following websites:     The web sites listed can be used to order a breast pump:    https://[employer]/    https://www.[employer]    https://www.[employer]/    Information and support for breastfeeding can be found here:    La Leche League International:  https://www.llli.org/    WIC Breastfeeding Support: https://wicbreastfeeding.fns.usda.gov/    [facility]: https://[facility].ky.gov/agencies/dph/dmch/nsb/Pages/breastfeeding.aspx    : https://www.marchofdimes.org/baby/breastfeeding-your-baby     Department of Health & Human Services: https://www.womenshealth.gov/breastfeeding/learning-breastfeed/finding-breastfeeding-support-and-information    [facility] Lactation Consultant  If you have questions or concerns about breastfeeding, you can contact  Kiah KRISHNAMURTHY  [facility]  [phone]    Formula feeding    For formula feeding, you can find useful information on these websites:    : https://www.marchofdimes.org/baby/feeding-your-baby-formula.aspx    Center for Disease Control and Prevention: https://www.cdc.gov/nutrition/infantandtoddlernutrition/formula-feeding/index.htm    Kids Health Organization: https://kidshealth.org/en/parents/formulafeed-starting.html    MothersBabies:  https://www.mothersbabies.org/baby/?gclid=EREeLDxtQjLH7guftPbw1UYJkD-[email or web address]_BwE

## 2022-12-21 ENCOUNTER — OFFICE VISIT (OUTPATIENT)
Dept: FAMILY MEDICINE CLINIC | Facility: CLINIC | Age: 22
End: 2022-12-21
Payer: COMMERCIAL

## 2022-12-21 ENCOUNTER — TELEPHONE (OUTPATIENT)
Dept: FAMILY MEDICINE CLINIC | Facility: CLINIC | Age: 22
End: 2022-12-21

## 2022-12-21 VITALS
HEART RATE: 90 BPM | BODY MASS INDEX: 22.21 KG/M2 | WEIGHT: 138.2 LBS | OXYGEN SATURATION: 99 % | SYSTOLIC BLOOD PRESSURE: 113 MMHG | TEMPERATURE: 98 F | HEIGHT: 66 IN | DIASTOLIC BLOOD PRESSURE: 78 MMHG

## 2022-12-21 DIAGNOSIS — J31.0 RHINITIS, UNSPECIFIED TYPE: ICD-10-CM

## 2022-12-21 DIAGNOSIS — R42 DIZZINESS: ICD-10-CM

## 2022-12-21 DIAGNOSIS — J01.90 ACUTE SINUSITIS, RECURRENCE NOT SPECIFIED, UNSPECIFIED LOCATION: Primary | ICD-10-CM

## 2022-12-21 PROCEDURE — 99213 OFFICE O/P EST LOW 20 MIN: CPT | Performed by: FAMILY MEDICINE

## 2022-12-21 RX ORDER — PNV,CALCIUM 72/IRON/FOLIC ACID 27 MG-1 MG
1 TABLET ORAL DAILY
COMMUNITY
Start: 2022-10-04

## 2022-12-21 RX ORDER — ONDANSETRON 8 MG/1
TABLET, ORALLY DISINTEGRATING ORAL
COMMUNITY
Start: 2022-10-24

## 2022-12-21 RX ORDER — AMOXICILLIN AND CLAVULANATE POTASSIUM 875; 125 MG/1; MG/1
1 TABLET, FILM COATED ORAL 2 TIMES DAILY
Qty: 10 TABLET | Refills: 0 | Status: SHIPPED | OUTPATIENT
Start: 2022-12-21 | End: 2022-12-26

## 2022-12-21 RX ORDER — DIPHENHYDRAMINE HYDROCHLORIDE 25 MG/1
25 CAPSULE ORAL DAILY
COMMUNITY
Start: 2022-10-04

## 2022-12-21 NOTE — TELEPHONE ENCOUNTER
Caller: Yuly Fernandes    Relationship: Self    Best call back number: [phone]    What medication are you requesting: fluticasone (Flonase) 50 MCG/ACT nasal spray        If a prescription is needed, what is your preferred pharmacy and phone number:      [id number] - [place] - [address]  - [phone] [facility] [phone]       Additional notes:  PATIENT STATES HER OBGYN TOLD SHE CAN TEMPORARILY TAKE THE MEDICATION

## 2022-12-22 RX ORDER — FLUTICASONE PROPIONATE 50 MCG
SPRAY, SUSPENSION (ML) NASAL
Qty: 18.2 ML | Refills: 2 | Status: SHIPPED | OUTPATIENT
Start: 2022-12-22

## 2023-01-10 NOTE — PROGRESS NOTES
Chief Complaint  Cough (X2 weeks, has tried otc medications without success) and Nasal Congestion (Green drainage x2 weeks )    Subjective        Yuly Fernandes presents to [facility]  History of Present Illness  2 weeks of cough and congestion without improvement with OTC meds associated with face pain, no current fever    Objective   Vital Signs:  /78 (BP Location: Left arm, Patient Position: Sitting, Cuff Size: Adult)   Pulse 90   Temp 98 °F (36.7 °C) (Temporal)   Ht 167.6 cm (66\")   Wt 62.7 kg (138 lb 3.2 oz)   SpO2 99%   BMI 22.31 kg/m²   Estimated body mass index is 22.31 kg/m² as calculated from the following:    Height as of this encounter: 167.6 cm (66\").    Weight as of this encounter: 62.7 kg (138 lb 3.2 oz).       BMI is within normal parameters. No other follow-up for BMI required.      Physical Exam  Vitals and nursing note reviewed.   Constitutional:       General: She is not in acute distress.     Appearance: She is not diaphoretic.   HENT:      Head: Normocephalic and atraumatic.      Right Ear: Tympanic membrane normal.      Left Ear: Tympanic membrane normal.      Nose: Congestion and rhinorrhea present.      Mouth/Throat:      Pharynx: No oropharyngeal exudate.   Eyes:      General: No scleral icterus.        Right eye: No discharge.         Left eye: No discharge.      Conjunctiva/sclera: Conjunctivae normal.   Neck:      Trachea: No tracheal deviation.   Pulmonary:      Effort: Pulmonary effort is normal.   Skin:     General: Skin is warm and dry.      Coloration: Skin is not pale.   Neurological:      Mental Status: She is alert and oriented to person, place, and time.   Psychiatric:         Behavior: Behavior normal.         Thought Content: Thought content normal.         Judgment: Judgment normal.        Result Review :                   Assessment and Plan   Diagnoses and all orders for this visit:    1. Acute sinusitis, recurrence  not specified, unspecified location (Primary)  -     amoxicillin-clavulanate (Augmentin) 875-125 MG per tablet; Take 1 tablet by mouth 2 (Two) Times a Day for 5 days.  Dispense: 10 tablet; Refill: 0             Follow Up   Return if symptoms worsen or fail to improve.  Patient was given instructions and counseling regarding her condition or for health maintenance advice. Please see specific information pulled into the AVS if appropriate.

## 2023-03-21 ENCOUNTER — PATIENT OUTREACH (OUTPATIENT)
Dept: LABOR AND DELIVERY | Facility: HOSPITAL | Age: 23
End: 2023-03-21
Payer: COMMERCIAL

## 2023-04-12 ENCOUNTER — OFFICE VISIT (OUTPATIENT)
Dept: FAMILY MEDICINE CLINIC | Facility: CLINIC | Age: 23
End: 2023-04-12
Payer: COMMERCIAL

## 2023-04-12 VITALS
HEART RATE: 95 BPM | DIASTOLIC BLOOD PRESSURE: 60 MMHG | BODY MASS INDEX: 26.52 KG/M2 | RESPIRATION RATE: 18 BRPM | WEIGHT: 165 LBS | TEMPERATURE: 96.7 F | OXYGEN SATURATION: 98 % | SYSTOLIC BLOOD PRESSURE: 110 MMHG | HEIGHT: 66 IN

## 2023-04-12 DIAGNOSIS — J32.9 OTHER SINUSITIS, UNSPECIFIED CHRONICITY: Primary | ICD-10-CM

## 2023-04-12 DIAGNOSIS — Z34.90 PREGNANCY, UNSPECIFIED GESTATIONAL AGE: ICD-10-CM

## 2023-04-12 RX ORDER — FERROUS SULFATE 325(65) MG
1 TABLET ORAL 3 TIMES DAILY
COMMUNITY
Start: 2023-03-20

## 2023-04-12 RX ORDER — AMOXICILLIN 875 MG/1
875 TABLET, COATED ORAL 2 TIMES DAILY
Qty: 20 TABLET | Refills: 2 | Status: SHIPPED | OUTPATIENT
Start: 2023-04-12 | End: 2023-04-22

## 2023-04-12 NOTE — PROGRESS NOTES
"Lavern Pappas APRASHOK  [facility]   Family Medicine  [address]  [place] [address]  Phone: [phone]  Fax: [phone]         Chief Complaint:  Chief Complaint   Patient presents with   • Sinus Problem   • Vomiting        History:  Yuly Fernandes is a 23 y.o. female that is an established patient. She  is here for evaluation of the above complaint.    HPI            ROS:  Review of Systems   Constitutional: Negative for fatigue, fever and unexpected weight change.   HENT: Positive for rhinorrhea, sinus pressure, sinus pain and sore throat. Negative for congestion, ear pain and voice change.    Eyes: Negative for visual disturbance.   Respiratory: Negative for shortness of breath and wheezing.    Cardiovascular: Negative for chest pain and palpitations.   Gastrointestinal: Negative for abdominal pain, nausea and vomiting.   Genitourinary: Negative for dysuria and flank pain.   Musculoskeletal: Negative for back pain, myalgias and neck pain.   Skin: Negative for color change and rash.   Neurological: Negative for dizziness, weakness, numbness and headaches.   Psychiatric/Behavioral: Negative for behavioral problems, dysphoric mood, self-injury and sleep disturbance.        reports that she has never smoked. She has never been exposed to tobacco smoke. She has never used smokeless tobacco. She reports current alcohol use. She reports that she does not use drugs.    Current Outpatient Medications   Medication Instructions   • amoxicillin (AMOXIL) 875 mg, Oral, 2 Times Daily   • FeroSul 325 (65 Fe) MG tablet 1 tablet, Oral, 3 Times Daily   • vitamin B-6 (PYRIDOXINE) 25 mg, Oral, Daily       OBJECTIVE:  /60 (BP Location: Right arm, Patient Position: Sitting, Cuff Size: Adult)   Pulse 95   Temp 96.7 °F (35.9 °C) (Infrared)   Resp 18   Ht 167.6 cm (65.98\")   Wt 74.8 kg (165 lb)   LMP 02/11/2022 (Approximate)   SpO2 98%   BMI 26.64 kg/m²    Physical " Exam  Vitals and nursing note reviewed.   Constitutional:       General: She is not in acute distress.     Appearance: She is ill-appearing.   HENT:      Right Ear: Tympanic membrane is not erythematous.      Left Ear: Tympanic membrane is not erythematous.      Nose: Congestion present.      Mouth/Throat:      Pharynx: Posterior oropharyngeal erythema present.   Eyes:      Conjunctiva/sclera: Conjunctivae normal.   Cardiovascular:      Rate and Rhythm: Normal rate.   Pulmonary:      Breath sounds: No wheezing.   Abdominal:      General: Bowel sounds are normal.   Musculoskeletal:         General: Normal range of motion.      Cervical back: Normal range of motion.   Skin:     General: Skin is warm.   Psychiatric:         Behavior: Behavior normal.         Judgment: Judgment normal.         BMI is >= 25 and <30. (Overweight) The following options were offered after discussion;: none (medical contraindication)    Procedures    Assessment/Plan:     Diagnoses and all orders for this visit:    1. Other sinusitis, unspecified chronicity (Primary)  -     amoxicillin (AMOXIL) 875 MG tablet; Take 1 tablet by mouth 2 (Two) Times a Day for 10 days.  Dispense: 20 tablet; Refill: 2    2. Pregnancy, unspecified gestational age        An After Visit Summary was printed and given to the patient at discharge.  Return if symptoms worsen or fail to improve.       There are no Patient Instructions on file for this visit.      Discussion:     I spent 20 minutes caring for Yuly on this date of service. This time includes time spent by me in the following activities: preparing for the visit, reviewing tests, obtaining and/or reviewing a separately obtained history, performing a medically appropriate examination and/or evaluation, counseling and educating the patient/family/caregiver, ordering medications, tests, or procedures, referring and communicating with other health care professionals, documenting information in the medical  record, independently interpreting results and communicating that information with the patient/family/caregiver and care coordination     Lavern KING 4/17/2023   Electronically signed.

## 2023-04-17 PROBLEM — J32.9 SINUSITIS: Status: ACTIVE | Noted: 2023-04-17

## 2023-04-17 PROBLEM — Z34.90 PREGNANCY: Status: ACTIVE | Noted: 2023-04-17

## 2023-05-05 ENCOUNTER — PATIENT OUTREACH (OUTPATIENT)
Dept: LABOR AND DELIVERY | Facility: HOSPITAL | Age: 23
End: 2023-05-05
Payer: COMMERCIAL

## 2023-05-05 NOTE — OUTREACH NOTE
[facility]    EPDS questionnaire sent to client in her mychart.     Lorrie Villeda RN  Maternity Nurse Navigator    5/5/2023, 13:04 CDT

## 2023-05-08 ENCOUNTER — PATIENT OUTREACH (OUTPATIENT)
Dept: LABOR AND DELIVERY | Facility: HOSPITAL | Age: 23
End: 2023-05-08
Payer: COMMERCIAL

## 2023-05-08 NOTE — OUTREACH NOTE
[facility]  Check-In    Current Estimated Gestational Age: 35w2d      Questions/Answers      Flowsheet Row Responses   Best Method for Contacting Cell   Able to keep appointments as scheduled Yes   Gender(s) and Name(s) female   Baby Active/Feeling Fetal Movemen Yes   How are you presently feeling? no complaints   Questions regarding prenatal visits or tests to be ordered? No   Special Considerations Birthing Ball, Peanut Ball   Supplies ready for baby Breast Pump, Car Seat, Clothing, Crib, Diapers, Feeding Supplies   Resource/Environmental Concerns None   Do you have any questions related to your care experience, your pregnancy, plans for delivery, any concerns, etc? No            Resource letter with maternal warning signs attached sent to client in her mychart.       Lorrie Villeda RN  Maternity Nurse Navigator    5/8/2023, 14:05 CDT

## 2023-05-08 NOTE — OUTREACH NOTE
[facility]    EPDS screen sent to client in her mychart.     Lorrie Villeda RN  Maternity Nurse Navigator    5/8/2023, 14:02 CDT

## 2023-06-06 ENCOUNTER — HOSPITAL ENCOUNTER (INPATIENT)
Facility: HOSPITAL | Age: 23
LOS: 2 days | Discharge: HOME | End: 2023-06-08
Attending: OBSTETRICS & GYNECOLOGY | Admitting: OBSTETRICS & GYNECOLOGY
Payer: COMMERCIAL

## 2023-06-06 ENCOUNTER — ANESTHESIA (OUTPATIENT)
Dept: LABOR AND DELIVERY | Facility: HOSPITAL | Age: 23
End: 2023-06-06
Payer: COMMERCIAL

## 2023-06-06 ENCOUNTER — ANESTHESIA EVENT (OUTPATIENT)
Dept: LABOR AND DELIVERY | Facility: HOSPITAL | Age: 23
End: 2023-06-06
Payer: COMMERCIAL

## 2023-06-06 DIAGNOSIS — Z37.9 NORMAL LABOR: Primary | ICD-10-CM

## 2023-06-06 PROBLEM — Z34.90 PREGNANCY: Status: RESOLVED | Noted: 2023-04-17 | Resolved: 2023-06-06

## 2023-06-06 PROBLEM — O20.9 VAGINAL BLEEDING AFFECTING EARLY PREGNANCY: Status: RESOLVED | Noted: 2022-05-24 | Resolved: 2023-06-06

## 2023-06-06 PROBLEM — O02.81 INAPPROPRIATE CHANGE IN QUANTITATIVE HUMAN CHORIONIC GONADOTROPIN (HCG) IN EARLY PREGNANCY: Status: RESOLVED | Noted: 2022-03-10 | Resolved: 2023-06-06

## 2023-06-06 LAB
ABO GROUP BLD: NORMAL
BLD GP AB SCN SERPL QL: NEGATIVE
DEPRECATED RDW RBC AUTO: 44.9 FL (ref 37–54)
ERYTHROCYTE [DISTWIDTH] IN BLOOD BY AUTOMATED COUNT: 13.3 % (ref 12.3–15.4)
HCT VFR BLD AUTO: 35.7 % (ref 34–46.6)
HGB BLD-MCNC: 11.2 G/DL (ref 12–15.9)
LYMPHOCYTES # BLD MANUAL: 1.37 10*3/MM3 (ref 0.7–3.1)
LYMPHOCYTES NFR BLD MANUAL: 3.1 % (ref 5–12)
MCH RBC QN AUTO: 28.9 PG (ref 26.6–33)
MCHC RBC AUTO-ENTMCNC: 31.4 G/DL (ref 31.5–35.7)
MCV RBC AUTO: 92.2 FL (ref 79–97)
MONOCYTES # BLD: 0.32 10*3/MM3 (ref 0.1–0.9)
NEUTROPHILS # BLD AUTO: 8.59 10*3/MM3 (ref 1.7–7)
NEUTROPHILS NFR BLD MANUAL: 82.7 % (ref 42.7–76)
NEUTS BAND NFR BLD MANUAL: 1 % (ref 0–5)
PLAT MORPH BLD: NORMAL
PLATELET # BLD AUTO: 187 10*3/MM3 (ref 140–450)
PMV BLD AUTO: 13.2 FL (ref 6–12)
RBC # BLD AUTO: 3.87 10*6/MM3 (ref 3.77–5.28)
RBC MORPH BLD: NORMAL
RH BLD: POSITIVE
T&S EXPIRATION DATE: NORMAL
VARIANT LYMPHS NFR BLD MANUAL: 13.3 % (ref 19.6–45.3)
WBC MORPH BLD: NORMAL
WBC NRBC COR # BLD: 10.27 10*3/MM3 (ref 3.4–10.8)

## 2023-06-06 PROCEDURE — 86850 RBC ANTIBODY SCREEN: CPT | Performed by: OBSTETRICS & GYNECOLOGY

## 2023-06-06 PROCEDURE — 88307 TISSUE EXAM BY PATHOLOGIST: CPT | Performed by: OBSTETRICS & GYNECOLOGY

## 2023-06-06 PROCEDURE — 51701 INSERT BLADDER CATHETER: CPT

## 2023-06-06 PROCEDURE — 85007 BL SMEAR W/DIFF WBC COUNT: CPT | Performed by: OBSTETRICS & GYNECOLOGY

## 2023-06-06 PROCEDURE — 36415 COLL VENOUS BLD VENIPUNCTURE: CPT | Performed by: OBSTETRICS & GYNECOLOGY

## 2023-06-06 PROCEDURE — 85027 COMPLETE CBC AUTOMATED: CPT | Performed by: OBSTETRICS & GYNECOLOGY

## 2023-06-06 PROCEDURE — C1755 CATHETER, INTRASPINAL: HCPCS | Performed by: NURSE ANESTHETIST, CERTIFIED REGISTERED

## 2023-06-06 PROCEDURE — 25010000002 OXYTOCIN PER 10 UNITS: Performed by: OBSTETRICS & GYNECOLOGY

## 2023-06-06 PROCEDURE — 86900 BLOOD TYPING SEROLOGIC ABO: CPT | Performed by: OBSTETRICS & GYNECOLOGY

## 2023-06-06 PROCEDURE — 86901 BLOOD TYPING SEROLOGIC RH(D): CPT | Performed by: OBSTETRICS & GYNECOLOGY

## 2023-06-06 RX ORDER — EPHEDRINE SULFATE 50 MG/ML
10 INJECTION, SOLUTION INTRAVENOUS
Status: DISCONTINUED | OUTPATIENT
Start: 2023-06-06 | End: 2023-06-06 | Stop reason: HOSPADM

## 2023-06-06 RX ORDER — ACETAMINOPHEN 325 MG/1
650 TABLET ORAL EVERY 6 HOURS PRN
Status: DISCONTINUED | OUTPATIENT
Start: 2023-06-06 | End: 2023-06-08 | Stop reason: HOSPADM

## 2023-06-06 RX ORDER — ONDANSETRON 2 MG/ML
4 INJECTION INTRAMUSCULAR; INTRAVENOUS EVERY 6 HOURS PRN
Status: DISCONTINUED | OUTPATIENT
Start: 2023-06-06 | End: 2023-06-06 | Stop reason: SDUPTHER

## 2023-06-06 RX ORDER — CARBOPROST TROMETHAMINE 250 UG/ML
250 INJECTION, SOLUTION INTRAMUSCULAR
Status: DISCONTINUED | OUTPATIENT
Start: 2023-06-06 | End: 2023-06-06 | Stop reason: HOSPADM

## 2023-06-06 RX ORDER — IBUPROFEN 800 MG/1
800 TABLET ORAL EVERY 8 HOURS PRN
Status: DISCONTINUED | OUTPATIENT
Start: 2023-06-06 | End: 2023-06-06 | Stop reason: HOSPADM

## 2023-06-06 RX ORDER — IBUPROFEN 600 MG/1
600 TABLET ORAL EVERY 6 HOURS PRN
Status: DISCONTINUED | OUTPATIENT
Start: 2023-06-06 | End: 2023-06-08 | Stop reason: HOSPADM

## 2023-06-06 RX ORDER — DOCUSATE SODIUM 100 MG/1
100 CAPSULE, LIQUID FILLED ORAL 2 TIMES DAILY
Status: DISCONTINUED | OUTPATIENT
Start: 2023-06-06 | End: 2023-06-08 | Stop reason: HOSPADM

## 2023-06-06 RX ORDER — FAMOTIDINE 10 MG/ML
20 INJECTION, SOLUTION INTRAVENOUS ONCE AS NEEDED
Status: DISCONTINUED | OUTPATIENT
Start: 2023-06-06 | End: 2023-06-06 | Stop reason: HOSPADM

## 2023-06-06 RX ORDER — ONDANSETRON 2 MG/ML
4 INJECTION INTRAMUSCULAR; INTRAVENOUS EVERY 6 HOURS PRN
Status: DISCONTINUED | OUTPATIENT
Start: 2023-06-06 | End: 2023-06-06 | Stop reason: HOSPADM

## 2023-06-06 RX ORDER — ONDANSETRON 4 MG/1
4 TABLET, FILM COATED ORAL EVERY 6 HOURS PRN
Status: DISCONTINUED | OUTPATIENT
Start: 2023-06-06 | End: 2023-06-06 | Stop reason: HOSPADM

## 2023-06-06 RX ORDER — MISOPROSTOL 200 UG/1
600 TABLET ORAL ONCE AS NEEDED
Status: DISCONTINUED | OUTPATIENT
Start: 2023-06-06 | End: 2023-06-08 | Stop reason: HOSPADM

## 2023-06-06 RX ORDER — TERBUTALINE SULFATE 1 MG/ML
0.25 INJECTION, SOLUTION SUBCUTANEOUS AS NEEDED
Status: DISCONTINUED | OUTPATIENT
Start: 2023-06-06 | End: 2023-06-06 | Stop reason: HOSPADM

## 2023-06-06 RX ORDER — SODIUM CHLORIDE, SODIUM LACTATE, POTASSIUM CHLORIDE, CALCIUM CHLORIDE 600; 310; 30; 20 MG/100ML; MG/100ML; MG/100ML; MG/100ML
125 INJECTION, SOLUTION INTRAVENOUS CONTINUOUS
Status: DISCONTINUED | OUTPATIENT
Start: 2023-06-06 | End: 2023-06-06

## 2023-06-06 RX ORDER — LIDOCAINE HYDROCHLORIDE 15 MG/ML
INJECTION, SOLUTION EPIDURAL; INFILTRATION; INTRACAUDAL; PERINEURAL AS NEEDED
Status: DISCONTINUED | OUTPATIENT
Start: 2023-06-06 | End: 2023-06-06

## 2023-06-06 RX ORDER — METHYLERGONOVINE MALEATE 0.2 MG/ML
200 INJECTION INTRAVENOUS ONCE AS NEEDED
Status: DISCONTINUED | OUTPATIENT
Start: 2023-06-06 | End: 2023-06-06 | Stop reason: HOSPADM

## 2023-06-06 RX ORDER — OXYTOCIN/0.9 % SODIUM CHLORIDE 30/500 ML
250 PLASTIC BAG, INJECTION (ML) INTRAVENOUS CONTINUOUS
Status: DISPENSED | OUTPATIENT
Start: 2023-06-06 | End: 2023-06-06

## 2023-06-06 RX ORDER — BISACODYL 10 MG
10 SUPPOSITORY, RECTAL RECTAL DAILY PRN
Status: DISCONTINUED | OUTPATIENT
Start: 2023-06-07 | End: 2023-06-08 | Stop reason: HOSPADM

## 2023-06-06 RX ORDER — TRISODIUM CITRATE DIHYDRATE AND CITRIC ACID MONOHYDRATE 500; 334 MG/5ML; MG/5ML
30 SOLUTION ORAL ONCE AS NEEDED
Status: DISCONTINUED | OUTPATIENT
Start: 2023-06-06 | End: 2023-06-06 | Stop reason: HOSPADM

## 2023-06-06 RX ORDER — LIDOCAINE HYDROCHLORIDE 20 MG/ML
INJECTION, SOLUTION EPIDURAL; INFILTRATION; INTRACAUDAL; PERINEURAL AS NEEDED
Status: DISCONTINUED | OUTPATIENT
Start: 2023-06-06 | End: 2023-06-06 | Stop reason: SURG

## 2023-06-06 RX ORDER — SODIUM CHLORIDE 0.9 % (FLUSH) 0.9 %
10 SYRINGE (ML) INJECTION EVERY 12 HOURS SCHEDULED
Status: DISCONTINUED | OUTPATIENT
Start: 2023-06-06 | End: 2023-06-06

## 2023-06-06 RX ORDER — ONDANSETRON 2 MG/ML
4 INJECTION INTRAMUSCULAR; INTRAVENOUS EVERY 6 HOURS PRN
Status: DISCONTINUED | OUTPATIENT
Start: 2023-06-06 | End: 2023-06-08 | Stop reason: HOSPADM

## 2023-06-06 RX ORDER — PROMETHAZINE HYDROCHLORIDE 12.5 MG/1
12.5 SUPPOSITORY RECTAL EVERY 6 HOURS PRN
Status: DISCONTINUED | OUTPATIENT
Start: 2023-06-06 | End: 2023-06-08 | Stop reason: HOSPADM

## 2023-06-06 RX ORDER — METHYLERGONOVINE MALEATE 0.2 MG/ML
200 INJECTION INTRAVENOUS ONCE AS NEEDED
Status: DISCONTINUED | OUTPATIENT
Start: 2023-06-06 | End: 2023-06-08 | Stop reason: HOSPADM

## 2023-06-06 RX ORDER — PROMETHAZINE HYDROCHLORIDE 25 MG/1
25 TABLET ORAL EVERY 6 HOURS PRN
Status: DISCONTINUED | OUTPATIENT
Start: 2023-06-06 | End: 2023-06-08 | Stop reason: HOSPADM

## 2023-06-06 RX ORDER — ACETAMINOPHEN 325 MG/1
650 TABLET ORAL EVERY 4 HOURS PRN
Status: DISCONTINUED | OUTPATIENT
Start: 2023-06-06 | End: 2023-06-06 | Stop reason: HOSPADM

## 2023-06-06 RX ORDER — PRENATAL VIT/IRON FUM/FOLIC AC 27MG-0.8MG
1 TABLET ORAL DAILY
Status: DISCONTINUED | OUTPATIENT
Start: 2023-06-07 | End: 2023-06-08 | Stop reason: HOSPADM

## 2023-06-06 RX ORDER — BUTORPHANOL TARTRATE 1 MG/ML
1 INJECTION, SOLUTION INTRAMUSCULAR; INTRAVENOUS
Status: DISCONTINUED | OUTPATIENT
Start: 2023-06-06 | End: 2023-06-06 | Stop reason: HOSPADM

## 2023-06-06 RX ORDER — TRAMADOL HYDROCHLORIDE 50 MG/1
50 TABLET ORAL EVERY 6 HOURS PRN
Status: DISCONTINUED | OUTPATIENT
Start: 2023-06-06 | End: 2023-06-08 | Stop reason: HOSPADM

## 2023-06-06 RX ORDER — LIDOCAINE HYDROCHLORIDE AND EPINEPHRINE 15; 5 MG/ML; UG/ML
INJECTION, SOLUTION EPIDURAL AS NEEDED
Status: DISCONTINUED | OUTPATIENT
Start: 2023-06-06 | End: 2023-06-06 | Stop reason: SURG

## 2023-06-06 RX ORDER — OXYTOCIN/0.9 % SODIUM CHLORIDE 30/500 ML
999 PLASTIC BAG, INJECTION (ML) INTRAVENOUS ONCE
Status: COMPLETED | OUTPATIENT
Start: 2023-06-06 | End: 2023-06-06

## 2023-06-06 RX ORDER — PROMETHAZINE HYDROCHLORIDE 25 MG/1
12.5 TABLET ORAL EVERY 6 HOURS PRN
Status: DISCONTINUED | OUTPATIENT
Start: 2023-06-06 | End: 2023-06-06 | Stop reason: HOSPADM

## 2023-06-06 RX ORDER — HYDROCORTISONE 25 MG/G
CREAM TOPICAL AS NEEDED
Status: DISCONTINUED | OUTPATIENT
Start: 2023-06-06 | End: 2023-06-08 | Stop reason: HOSPADM

## 2023-06-06 RX ORDER — PROMETHAZINE HYDROCHLORIDE 12.5 MG/1
12.5 SUPPOSITORY RECTAL EVERY 6 HOURS PRN
Status: DISCONTINUED | OUTPATIENT
Start: 2023-06-06 | End: 2023-06-06 | Stop reason: HOSPADM

## 2023-06-06 RX ORDER — OXYTOCIN/0.9 % SODIUM CHLORIDE 30/500 ML
125 PLASTIC BAG, INJECTION (ML) INTRAVENOUS CONTINUOUS PRN
Status: COMPLETED | OUTPATIENT
Start: 2023-06-06 | End: 2023-06-06

## 2023-06-06 RX ORDER — CARBOPROST TROMETHAMINE 250 UG/ML
250 INJECTION, SOLUTION INTRAMUSCULAR ONCE AS NEEDED
Status: DISCONTINUED | OUTPATIENT
Start: 2023-06-06 | End: 2023-06-08 | Stop reason: HOSPADM

## 2023-06-06 RX ORDER — SODIUM CHLORIDE 0.9 % (FLUSH) 0.9 %
1-10 SYRINGE (ML) INJECTION AS NEEDED
Status: DISCONTINUED | OUTPATIENT
Start: 2023-06-06 | End: 2023-06-08 | Stop reason: HOSPADM

## 2023-06-06 RX ORDER — PRENATAL VIT/IRON FUM/FOLIC AC 27MG-0.8MG
1 TABLET ORAL DAILY
COMMUNITY

## 2023-06-06 RX ORDER — SODIUM CHLORIDE 0.9 % (FLUSH) 0.9 %
10 SYRINGE (ML) INJECTION AS NEEDED
Status: DISCONTINUED | OUTPATIENT
Start: 2023-06-06 | End: 2023-06-06

## 2023-06-06 RX ORDER — MISOPROSTOL 200 UG/1
800 TABLET ORAL ONCE AS NEEDED
Status: DISCONTINUED | OUTPATIENT
Start: 2023-06-06 | End: 2023-06-06 | Stop reason: HOSPADM

## 2023-06-06 RX ORDER — SODIUM CHLORIDE, SODIUM LACTATE, POTASSIUM CHLORIDE, CALCIUM CHLORIDE 600; 310; 30; 20 MG/100ML; MG/100ML; MG/100ML; MG/100ML
125 INJECTION, SOLUTION INTRAVENOUS CONTINUOUS
Status: DISCONTINUED | OUTPATIENT
Start: 2023-06-06 | End: 2023-06-08 | Stop reason: HOSPADM

## 2023-06-06 RX ORDER — ONDANSETRON 4 MG/1
4 TABLET, FILM COATED ORAL EVERY 6 HOURS PRN
Status: DISCONTINUED | OUTPATIENT
Start: 2023-06-06 | End: 2023-06-08 | Stop reason: HOSPADM

## 2023-06-06 RX ORDER — LIDOCAINE HYDROCHLORIDE 20 MG/ML
10 INJECTION, SOLUTION INFILTRATION; PERINEURAL ONCE AS NEEDED
Status: DISCONTINUED | OUTPATIENT
Start: 2023-06-06 | End: 2023-06-06

## 2023-06-06 RX ADMIN — LIDOCAINE HYDROCHLORIDE 3 ML: 20 INJECTION, SOLUTION EPIDURAL; INFILTRATION; INTRACAUDAL; PERINEURAL at 16:31

## 2023-06-06 RX ADMIN — IBUPROFEN 600 MG: 600 TABLET ORAL at 20:58

## 2023-06-06 RX ADMIN — DOCUSATE SODIUM 100 MG: 100 CAPSULE ORAL at 20:58

## 2023-06-06 RX ADMIN — Medication 10 ML: at 15:28

## 2023-06-06 RX ADMIN — SODIUM CHLORIDE, POTASSIUM CHLORIDE, SODIUM LACTATE AND CALCIUM CHLORIDE 125 ML/HR: 600; 310; 30; 20 INJECTION, SOLUTION INTRAVENOUS at 15:28

## 2023-06-06 RX ADMIN — LIDOCAINE HYDROCHLORIDE AND EPINEPHRINE 3 ML: 15; 5 INJECTION, SOLUTION EPIDURAL at 16:29

## 2023-06-06 RX ADMIN — OXYTOCIN 125 ML/HR: 10 INJECTION, SOLUTION INTRAMUSCULAR; INTRAVENOUS at 18:16

## 2023-06-06 RX ADMIN — Medication 999 ML/HR: at 17:02

## 2023-06-06 RX ADMIN — LIDOCAINE HYDROCHLORIDE AND EPINEPHRINE 2 ML: 15; 5 INJECTION, SOLUTION EPIDURAL at 16:32

## 2023-06-06 RX ADMIN — SODIUM CHLORIDE, POTASSIUM CHLORIDE, SODIUM LACTATE AND CALCIUM CHLORIDE 1000 ML: 600; 310; 30; 20 INJECTION, SOLUTION INTRAVENOUS at 13:01

## 2023-06-06 RX ADMIN — Medication: at 20:58

## 2023-06-06 NOTE — ANESTHESIA PROCEDURE NOTES
Labor Epidural    Pre-sedation assessment completed: 6/6/2023 4:13 PM    Patient reassessed immediately prior to procedure    Patient location during procedure: OB  Start Time: 6/6/2023 4:11 PM  Performed By  CRNA/CAA: Srinivas Avelar CRNA  Preanesthetic Checklist  Completed: patient identified, IV checked, site marked, risks and benefits discussed, surgical consent, monitors and equipment checked, pre-op evaluation and timeout performed  Additional Notes  Epidural inserted by SANDRA Camarena  Prep:  Pt Position:sitting  Sterile Tech:cap, gloves, mask and sterile barrier  Prep:chlorhexidine gluconate and isopropyl alcohol and DuraPrep  Monitoring:blood pressure monitoring, continuous pulse oximetry and EKG  Epidural Block Procedure:  Approach:midline  Guidance:landmark technique and palpation technique  Location:L4-L5  Needle Type:Tuohy  Needle Gauge:18 G  Loss of Resistance Medium: air  Loss of Resistance: 9cm  Cath Depth at skin:14 cm  Paresthesia: none  Aspiration:negative  Test Dose:negative  Number of Attempts: 2  Post Assessment:  Dressing:occlusive dressing applied and secured with tape  Pt Tolerance:patient tolerated the procedure well with no apparent complications  Complications:no

## 2023-06-06 NOTE — PLAN OF CARE
Goal Outcome Evaluation:  Vaginal delivery of infant.  Epidural given, at this time pt states that her legs and feet are numb.  FF/ML/UU scant to light lochia.  Denies pain

## 2023-06-06 NOTE — L&D DELIVERY NOTE
[facility]  Vaginal Delivery Note    Delivery details     Delivery: Vaginal, Spontaneous     YOB: 2025    Time of Birth: 5:01 PM      Anesthesia: Epidural     Delivering clinician: Analy Olson    Forceps?   No   Vacuum? No    Shoulder dystocia present: No      Delivery narrative:      Infant details    Findings: female  infant     Infant observations: Weight: 3420 g (7 lb 8.6 oz)   Length: 19.5  in   Apgars: 8  @ 1 minute /    9  @ 5 minutes     Placenta, Cord, and Fluid    Placenta delivered  Spontaneous  at   2023  5:03 PM     Cord: 3 vessels  present.   Nuchal Cord?  yes; Number of nuchal loops present:  2    Cord blood obtained: Yes      Repair    Episiotomy: None    Lacerations: No   Estimated Blood Loss:     Suture used for repair: N/a       Complications  none    Disposition  Mother to Mother Baby/Postpartum  in stable condition currently.  Baby to remains with mom  in stable condition currently.      Analy Olson MD  23  07:14 CDT

## 2023-06-06 NOTE — ANESTHESIA PREPROCEDURE EVALUATION
Anesthesia Evaluation     Patient summary reviewed and Nursing notes reviewed   NPO Solid Status: > 8 hours  NPO Liquid Status: > 4 hours           Airway   Mallampati: I  TM distance: >3 FB  Neck ROM: full  No difficulty expected  Dental      Pulmonary - negative pulmonary ROS   Cardiovascular - negative cardio ROS        Neuro/Psych- negative ROS  GI/Hepatic/Renal/Endo - negative ROS     Musculoskeletal (-) negative ROS    Abdominal    Substance History - negative use     OB/GYN    (+) Pregnant        Other - negative ROS                       Anesthesia Plan    ASA 2     epidural       Anesthetic plan, risks, benefits, and alternatives have been provided, discussed and informed consent has been obtained with: patient.

## 2023-06-07 LAB
HCT VFR BLD AUTO: 30.1 % (ref 34–46.6)
HGB BLD-MCNC: 9.5 G/DL (ref 12–15.9)

## 2023-06-07 PROCEDURE — 85014 HEMATOCRIT: CPT | Performed by: OBSTETRICS & GYNECOLOGY

## 2023-06-07 PROCEDURE — 85018 HEMOGLOBIN: CPT | Performed by: OBSTETRICS & GYNECOLOGY

## 2023-06-07 RX ADMIN — ACETAMINOPHEN 650 MG: 325 TABLET ORAL at 01:24

## 2023-06-07 RX ADMIN — ACETAMINOPHEN 650 MG: 325 TABLET ORAL at 22:48

## 2023-06-07 RX ADMIN — IBUPROFEN 600 MG: 600 TABLET ORAL at 05:58

## 2023-06-07 RX ADMIN — IBUPROFEN 600 MG: 600 TABLET ORAL at 18:27

## 2023-06-07 RX ADMIN — DOCUSATE SODIUM 100 MG: 100 CAPSULE ORAL at 09:15

## 2023-06-07 RX ADMIN — DOCUSATE SODIUM 100 MG: 100 CAPSULE ORAL at 20:01

## 2023-06-07 RX ADMIN — IBUPROFEN 600 MG: 600 TABLET ORAL at 12:25

## 2023-06-07 RX ADMIN — PRENATAL VIT W/ FE FUMARATE-FA TAB 27-0.8 MG 1 TABLET: 27-0.8 TAB at 09:15

## 2023-06-07 NOTE — PLAN OF CARE
Goal Outcome Evaluation:  Plan of Care Reviewed With: patient        Progress: improving  Outcome Evaluation: FF ML U1 light lochia. Pain controlled with motin and tylenol. Needs encouragement to feed baby, needs lactation support.

## 2023-06-07 NOTE — ANESTHESIA POSTPROCEDURE EVALUATION
"Patient: Yuly Fernandes    Procedure Summary       Date: 06/06/23 Room / Location:     Anesthesia Start: 1611 Anesthesia Stop: 1701    Procedure: LABOR ANALGESIA Diagnosis:     Scheduled Providers:  Provider: Srinivas Avelar CRNA    Anesthesia Type: epidural ASA Status: 2            Anesthesia Type: epidural    Vitals  Vitals Value Taken Time   /72 06/07/23 1445   Temp 97.6 °F (36.4 °C) 06/07/23 1445   Pulse 78 06/07/23 1445   Resp 16 06/07/23 1445   SpO2 98 % 06/07/23 1445           Post Anesthesia Care and Evaluation    Patient location during evaluation: floor  Patient participation: complete - patient participated  Level of consciousness: awake and alert  Pain management: adequate    Airway patency: patent  Anesthetic complications: No anesthetic complications    Cardiovascular status: acceptable  Respiratory status: acceptable  Hydration status: acceptable  Post Neuraxial Block status: Motor and sensory function returned to baseline and No signs or symptoms of PDPH  Comments: Blood pressure 124/72, pulse 78, temperature 97.6 °F (36.4 °C), temperature source Temporal, resp. rate 16, height 167.6 cm (66\"), weight 80.3 kg (177 lb), last menstrual period 02/11/2022, SpO2 98 %, currently breastfeeding.    Pt discharged from PACU based on ten score >8    "

## 2023-06-07 NOTE — LACTATION NOTE
This note was copied from a baby's chart.  Lactation consult conducted with mother. Motif pump at bds, as well as open formula bottle with very little gone from it. Mother voiced anxiety over infant seemingly not bfing well. Yuly reports because of this, she pumped, then gave formula.  Mother was skin to skin with infant. Praised this.    Educated on infant's abdomen size; how 5 mls of colostrum equals a feeding first 2 days pp, but that it takes approx 15mls formula to get approximately same nutrition / calories. Urged pt not to give over 20 mls if formula is given. Also educated on colostrum appearance and quantity; how drops are sufficient. Mom relieved to know this. Encouraged pt to keep pumping for nipple stimulation, and attempting bfing for each feed. Encouraged pt to call for help as needed.     Breast Exam  Bilateral nipples are not damaged. However, L appears to have dark healing compression stripe. Pt voiced bfing is painful. Educated on this, and how to avoid.     Latching  Urged pt to watch Attaching Your Baby to the Breast video prior to next bfing attempt. Pt says will do same.

## 2023-06-07 NOTE — LACTATION NOTE
Mother's Name: Yuly Phone #:[phone]  Infant Name: Asha  :2025  Gestation:39w3d  Day of life:0  Birth weight:  7-8.6 (3420g) Discharge weight:  Weight Loss:   24 hour Summary of Feeds:  Voids:  Stools:  Assistive devices (shields, shells, etc):NA  Significant Maternal history:, pumped and formula fed with 2 previous children for 2 & 5 Months d/t latching difficulty  Maternal Concerns:  denies  Maternal Goal: try breastfeeding, pump and supplement as needed  Mother's Medications: Iron, PNV, Vit B6  Breastpump for home: Spectra  Ped follow up appt:[facility]    Called to LDR to assist with first feeding. Infant in modified cradle hold, infant attempting to self latch to left breast. With mother's permission, moved infant downward to allow head tilt, assisted to sandwich breast, infant latched on easily and nursed well for 26 mins. Nipple creased at tip and small amount of blood visible. Applied colostrum, mother easily expresses EBM. Moved to right breast in same, infant latched with minimal assistance. Infant exhibits deep jaw drops and occasional audible swallows. Initial breastfeeding packet given and reviewed. Breastfeeding book provided. Encourage mother to watch [employer] videos 1-3. Questions denied. Encouragement and support provided.     Instructed mom our lactation team is here for continued support throughout their breastfeeding journey. Our team has encouraged mom to call with any questions or concerns that may arise after discharge.     Breastfeeding and Diaper Chart  Check List for Essentials of Positioning And Latch-on handout provided by Lactation Education Resources  Hand Expression handout provided by Lactation Education Resources  Five Keys to Successful Breastfeeding handout provided by Lactation Education Resources    The Many Benefits if Breastfeeding handout given  Breastfeeding saves time  *Breastfeeding allows you to calm or feed your baby immediately, which leads to a happier  baby who cries less  *There is nothing to buy, prepare, or maintain.There is nothing to clean or sterilize.  Breastfeeding builds a mothers confidence  *She knows all her baby needs to thrive is her!  Breastfeeding saves Money  *There is no formula to buy and healthier breast fed babies have less medical costs  Healthy Mom/Healthy baby  * babies get sick less often, and when they do they are usually sick less severely and for a shorter time  * babies have fewer ear infections  * babies have fewer allergies  *Mothers who breastfeed have a lower risk for cancer, osteoporosis, anemia, high blood pressure, obesity, and Type ll diabetes  *Mothers miss less work days with sick babies  Breast fed babies have a better dental health  * babies have better jaw development which requires lest orthodontic work  *Breast milk does not promote cavities  * babies can nurse at night without worry of tooth decay  Breastfeeding allows a baby to reach his full IQ potential  *The longer a baby is breast fed, the better their brain development  Breast fed babies and moms are more relaxed  *The hormones released during breastfeeding have a calming effect on mothers  *Breastfeeding requires mom to take a break; this may help mom get more rest after delivery  *Breastfeeding is quicker than preparing formula which allows mom and baby to get back to sleep faster  *Breastfeeding promotes bonding and allows mom to learn babies cues and care needs more quickly  Breastfeeding cleanup is easier  *The bowel movements and spit up of breast fed babies doesn't smell as bad  *Spit-up of breast fed babies doesn't stain clothing  Getting out of the hourse is easier  *No formula bottles to prepare and carry safely   *No time restraints due to worry about what baby will eat  *No worries about warming a bottle or finding safe water to prepare bottles  Breastfeeding mother get their bodies back sooner  *The uterus  shrinks more quickly and completely, which allows a flatter tummy  *Breastfeeding burns 400-500 calories a day; making milk torches stored fat!  Breastfeeding is better for the environment  *There is no trash to dispose of after breastfeeding  *There is no production facility to produce breast milk; moms body does it all without the pollution of a factory    Educational Breastfeeding Videos on   YouTube  (length of video in minutes)    Expressing the First Milk - Small Baby Series (7:19)  Hand Expression [facility] (7:34)  Attaching Your Baby at the Breast - Breastfeeding Series (10:26)  The Power of Pumping - [facility]   Maximizing Production [facility] (9:35)  Instructions for use Medela Symphony breastpump (English) (1:58)  Medela 2-Phase Expression (4:05)  Medela double pumping video (2:19)  Choosing your PersonalFit breast shield size (3:04)  We also recommend visiting [email or web address] Screening Technology for valuable education and videos on breastfeeding full term AND  infants. This is a great resource to begin learning about breastfeeding during pregnancy as well.                [facility]             [phone]     Breastfeeding A Great Start Book by Paula Magana RN, LCCE, ICD and GVilma Gilmore MD, FACOG    [facility] Breast[facility]oms [facility] by [facility]    Freshly Expressed Breastmilk Storage Guidelines for Healthy Term Babies References: [email or web address].com

## 2023-06-07 NOTE — PLAN OF CARE
Goal Outcome Evaluation:  Plan of Care Reviewed With: patient, spouse        Progress: improving  Outcome Evaluation: VSS; FF/ML/U1 with scant lochia, pt voiding, pain controlled with PO motrin, shower today, pt thinking about tdap vaccine, depression score a 3, patient bonding well with infant.

## 2023-06-07 NOTE — PROGRESS NOTES
"[facility]  Vaginal Delivery Progress Note    Subjective   Postpartum Day 1: Vaginal Delivery    The patient feels well.  Her pain is well controlled with prescribed pain medications.   She is ambulating well.  Patient describes her bleeding as moderate lochia.    Breastfeeding: without difficulty.    Objective     Vital Signs Range for the last 24 hours  Temperature: Temp:  [98 °F (36.7 °C)-98.5 °F (36.9 °C)] 98.3 °F (36.8 °C)   Temp Source: Temp src: Oral   BP: BP: ()/(41-85) 102/52   Pulse: Heart Rate:  [] 89   Respirations: Resp:  [14-20] 18   SPO2: SpO2:  [98 %-100 %] 98 %   O2 Amount (l/min):     O2 Devices Device (Oxygen Therapy): room air   Weight: Weight:  [80.3 kg (177 lb)] 80.3 kg (177 lb)     Admit Height:  Height: 167.6 cm (66\")      Physical Exam:  General:  no acute distresss.  Abdomen: Fundus: appropriate, firm, non tender  Extremities: normal, atraumatic, no cyanosis, and trace edema.       Lab results reviewed:  Yes   Rubella:  No results found for: RUBELLAIGGIN Nurse Transcribed from prenatal record --  No components found for: EXTRUBELQUAL  Rh Status:    RH type   Date Value Ref Range Status   06/06/2023 Positive  Final     Immunizations:   Immunization History   Administered Date(s) Administered    COVID-19 (UNSPECIFIED) 06/02/2021, 06/30/2021    Tdap 05/16/2018       Assessment & Plan       * No active hospital problems. *      Yuly Fernandes is Day 1  post-partum    Vaginal, Spontaneous     .      Plan:  Continue current care.      Analy Olson MD  6/7/2023  07:15 CDT  "

## 2023-06-08 ENCOUNTER — PATIENT OUTREACH (OUTPATIENT)
Dept: LABOR AND DELIVERY | Facility: HOSPITAL | Age: 23
End: 2023-06-08
Payer: COMMERCIAL

## 2023-06-08 VITALS
TEMPERATURE: 97.2 F | WEIGHT: 177 LBS | RESPIRATION RATE: 16 BRPM | HEART RATE: 74 BPM | OXYGEN SATURATION: 98 % | DIASTOLIC BLOOD PRESSURE: 80 MMHG | SYSTOLIC BLOOD PRESSURE: 127 MMHG | HEIGHT: 66 IN | BODY MASS INDEX: 28.45 KG/M2

## 2023-06-08 LAB
CYTO UR: NORMAL
LAB AP CASE REPORT: NORMAL
Lab: NORMAL
PATH REPORT.FINAL DX SPEC: NORMAL
PATH REPORT.GROSS SPEC: NORMAL

## 2023-06-08 RX ORDER — IBUPROFEN 600 MG/1
600 TABLET ORAL EVERY 6 HOURS PRN
Qty: 60 TABLET | Refills: 2 | Status: SHIPPED | OUTPATIENT
Start: 2023-06-08

## 2023-06-08 RX ORDER — TRAMADOL HYDROCHLORIDE 50 MG/1
50 TABLET ORAL EVERY 6 HOURS PRN
Qty: 10 TABLET | Refills: 0 | Status: SHIPPED | OUTPATIENT
Start: 2023-06-08 | End: 2023-06-13

## 2023-06-08 RX ADMIN — PRENATAL VIT W/ FE FUMARATE-FA TAB 27-0.8 MG 1 TABLET: 27-0.8 TAB at 08:18

## 2023-06-08 NOTE — DISCHARGE SUMMARY
Discharge Summary     Jarred Fernandes  : 2000  MRN: [id number]  CSN: [id number]    Date of Admission: 2023   Date of Discharge:  2023   Delivering Physician: Analy Olson        Admission Diagnosis: Normal labor [O80, Z37.9]  Normal delivery [O80]   Discharge Diagnosis: Pregnancy at 39w3d - delivered       Procedures: 2023  - Vaginal, Spontaneous       Hospital Course  Patient is a 23 y.o.  who at 39w3d had a uncomplicated vaginal delivery.  Her postpartum course was without complications.  On PPD #2 she was ready for discharge.  She had normal lochia and pain was well controlled with oral medications.    Infant  female  fetus weighing 3420 g (7 lb 8.6 oz)   Apgars -  8 @ 1 minute /  9 @ 5 minutes.    Discharge labs  Lab Results   Component Value Date    WBC 10.27 2023    HGB 9.5 (L) 2023    HCT 30.1 (L) 2023     2023       Discharge Medications     Discharge Medications        New Medications        Instructions Start Date   ibuprofen 600 MG tablet  Commonly known as: ADVIL,MOTRIN   600 mg, Oral, Every 6 Hours PRN      traMADol 50 MG tablet  Commonly known as: ULTRAM   50 mg, Oral, Every 6 Hours PRN             Continue These Medications        Instructions Start Date   FeroSul 325 (65 FE) MG tablet  Generic drug: ferrous sulfate   1 tablet, Oral, 3 Times Daily      prenatal vitamin 27-0.8 27-0.8 MG tablet tablet   1 tablet, Oral, Daily      vitamin B-6 25 MG tablet  Commonly known as: PYRIDOXINE   25 mg, Oral, Daily               Discharge Disposition Home or Self Care   Condition on Discharge: good   Follow-up: 4 weeks with Dr. Olson, if no appointment, please call office     Analy Olson MD  2023

## 2023-06-08 NOTE — PAYOR COMM NOTE
"ADMIT 6/6/2023  MATERNITY    [facility]  RAINER,  [phone]  OR  FAX   [phone]    Yuly Barnett (23 y.o. Female)       Date of Birth   2000    Social Security Number       Address   [address] [place], [place] [address] [facility] [address]    Home Phone   [phone]    MRN   [id number]       Oriental orthodox   Other    Marital Status                               Admission Date   6/6/23    Admission Type   Elective    Admitting Provider   Analy Olson MD    Attending Provider   Analy Olson MD    Department, Room/Bed   [facility] MOTHER BABY 2A, M241/1       Discharge Date       Discharge Disposition   Home or Self Care    Discharge Destination                                 Attending Provider: Analy Olson MD    Allergies: No Known Allergies    Isolation: None   Infection: None   Code Status: CPR    Ht: 167.6 cm (66\")   Wt: 80.3 kg (177 lb)    Admission Cmt: None   Principal Problem: None                  Active Insurance as of 6/6/2023       Primary Coverage       Payor Plan Insurance Group Employer/Plan Group    [employer] [place] [facility] [place]        Payor Plan Address Payor Plan Phone Number Payor Plan Fax Number Effective Dates    [address]   2/1/2018 - None Entered    [facility] [id number]         Subscriber Name Subscriber Birth Date Member ID       YULY BARNETT 2000 [id number]                     Emergency Contacts        (Rel.) Home Phone Work Phone Mobile Phone    Jose Luis Barnett (Spouse) -- -- [phone]              History & Physical    No notes of this type exist for this encounter.          Discharge Summaryl        Analy Olson MD at 06/08/23 0947          [facility]  Vaginal Delivery Progress Note    Subjective  Postpartum Day 2: Vaginal Delivery    The patient feels well.  Her pain is well controlled with prescribed pain medications.   She is ambulating well.  " "Patient describes her bleeding as thin lochia.    Breastfeeding: without difficulty.    Objective    Vital Signs Range for the last 24 hours  Temperature: Temp:  [97.2 °F (36.2 °C)-98 °F (36.7 °C)] 97.2 °F (36.2 °C)   Temp Source: Temp src: Temporal   BP: BP: (116-127)/(72-80) 127/80   Pulse: Heart Rate:  [74-82] 74   Respirations: Resp:  [16-18] 16   SPO2: SpO2:  [98 %-99 %] 98 %   O2 Amount (l/min):     O2 Devices Device (Oxygen Therapy): room air   Weight:       Admit Height:  Height: 167.6 cm (66\")      Physical Exam:  General:  no acute distresss.  Abdomen: Fundus: appropriate, firm, non tender  Extremities: normal, atraumatic, no cyanosis, and trace edema.       Lab results reviewed:  Yes   Rubella:  No results found for: RUBELLAIGGIN Nurse Transcribed from prenatal record --  No components found for: EXTRUBELQUAL  Rh Status:    RH type   Date Value Ref Range Status   06/06/2023 Positive  Final     Immunizations:   Immunization History   Administered Date(s) Administered    COVID-19 (UNSPECIFIED) 06/02/2021, 06/30/2021    Tdap 05/16/2018       Assessment & Plan      * No active hospital problems. *      Yuly Fernandes is Day 2  post-partum    Vaginal, Spontaneous     .      Plan:  Discharge home with standard precautions and return to clinic in 4-6 weeks.      Analy Olson MD  6/8/2023  09:47 CDT    Electronically signed by Analy Olson MD at 06/08/23 0948       Debbi Carr RN at 06/08/23 5622          Goal Outcome Evaluation:              Outcome Evaluation: VSS. FF/ML/U2 scant lochia. Pt ambulating; voiding WNL. Tylenol and Motrin for pain is effective. Breastfeeding and pumping as well as supplementing with formula. Bonding well with infant and pt plans for discharge today.           Electronically signed by Debbi Carr RN at 06/08/23 4089       Ry Rehman RN at 06/07/23 9748          Goal Outcome Evaluation:  Plan of Care Reviewed With: " patient, spouse        Progress: improving  Outcome Evaluation: VSS; FF/ML/U1 with scant lochia, pt voiding, pain controlled with PO motrin, shower today, pt thinking about tdap vaccine, depression score a 3, patient bonding well with infant.           Electronically signed by Ry Rehman RN at 06/07/23 1731       Sharmila Griffin at 06/07/23 1141          This note was copied from a baby's chart.  Lactation consult conducted with mother. Motif pump at s, as well as open formula bottle with very little gone from it. Mother voiced anxiety over infant seemingly not bfing well. Yuly reports because of this, she pumped, then gave formula.  Mother was skin to skin with infant. Praised this.    Educated on infant's abdomen size; how 5 mls of colostrum equals a feeding first 2 days pp, but that it takes approx 15mls formula to get approximately same nutrition / calories. Urged pt not to give over 20 mls if formula is given. Also educated on colostrum appearance and quantity; how drops are sufficient. Mom relieved to know this. Encouraged pt to keep pumping for nipple stimulation, and attempting bfing for each feed. Encouraged pt to call for help as needed.     Breast Exam  Bilateral nipples are not damaged. However, L appears to have dark healing compression stripe. Pt voiced bfing is painful. Educated on this, and how to avoid.     Latching  Urged pt to watch Attaching Your Baby to the Breast video prior to next bfing attempt. Pt says will do same.    Electronically signed by Sharmila Griffin at 06/07/23 1157       Natalee Aguilar PCT at 06/07/23 0802          Appointment with Dr Olson on July 10th @ 10:45 AM    Electronically signed by Natalee Aguilar PCT at 06/07/23 0892       Analy Olson MD at 06/07/23 0714          [facility]  Vaginal Delivery Progress Note    Subjective  Postpartum Day 1: Vaginal Delivery    The patient feels well.  Her pain is well controlled with prescribed pain medications.  "  She is ambulating well.  Patient describes her bleeding as moderate lochia.    Breastfeeding: without difficulty.    Objective    Vital Signs Range for the last 24 hours  Temperature: Temp:  [98 °F (36.7 °C)-98.5 °F (36.9 °C)] 98.3 °F (36.8 °C)   Temp Source: Temp src: Oral   BP: BP: ()/(41-85) 102/52   Pulse: Heart Rate:  [] 89   Respirations: Resp:  [14-20] 18   SPO2: SpO2:  [98 %-100 %] 98 %   O2 Amount (l/min):     O2 Devices Device (Oxygen Therapy): room air   Weight: Weight:  [80.3 kg (177 lb)] 80.3 kg (177 lb)     Admit Height:  Height: 167.6 cm (66\")      Physical Exam:  General:  no acute distresss.  Abdomen: Fundus: appropriate, firm, non tender  Extremities: normal, atraumatic, no cyanosis, and trace edema.       Lab results reviewed:  Yes   Rubella:  No results found for: RUBELLAIGGIN Nurse Transcribed from prenatal record --  No components found for: EXTRUBELQUAL  Rh Status:    RH type   Date Value Ref Range Status   06/06/2023 Positive  Final     Immunizations:   Immunization History   Administered Date(s) Administered    COVID-19 (UNSPECIFIED) 06/02/2021, 06/30/2021    Tdap 05/16/2018       Assessment & Plan      * No active hospital problems. *      Yuly Fernandes is Day 1  post-partum    Vaginal, Spontaneous     .      Plan:  Continue current care.      Analy Olson MD  6/7/2023  07:15 CDT    Electronically signed by Analy Olson MD at 06/07/23 0715       Belem Zuniga, RN at 06/07/23 0633          Goal Outcome Evaluation:  Plan of Care Reviewed With: patient        Progress: improving  Outcome Evaluation: FF ML U1 light lochia. Pain controlled with motin and tylenol. Needs encouragement to feed baby, needs lactation support.           Electronically signed by Belem Zuniga, RN at 06/07/23 0657       Sofi Castillo RN at 06/06/23 7895          Goal Outcome Evaluation:  Vaginal delivery of infant.  Epidural given, at this time pt states " that her legs and feet are numb.  FF/ML/UU scant to light lochia.  Denies pain                           Electronically signed by Sofi Castillo, RN at 23 1831       Cynthia Peters, RN at 23 1800          Mother's Name: Yuly Phone #:[phone]  Infant Name: Asha  :2025  Gestation:39w3d  Day of life:0  Birth weight:  7-8.6 (3420g) Discharge weight:  Weight Loss:   24 hour Summary of Feeds:  Voids:  Stools:  Assistive devices (shields, shells, etc):NA  Significant Maternal history:, pumped and formula fed with 2 previous children for 2 & 5 Months d/t latching difficulty  Maternal Concerns:  denies  Maternal Goal: try breastfeeding, pump and supplement as needed  Mother's Medications: Iron, PNV, Vit B6  Breastpump for home: Spectra  Ped follow up appt:[facility]    Called to LDR to assist with first feeding. Infant in modified cradle hold, infant attempting to self latch to left breast. With mother's permission, moved infant downward to allow head tilt, assisted to sandwich breast, infant latched on easily and nursed well for 26 mins. Nipple creased at tip and small amount of blood visible. Applied colostrum, mother easily expresses EBM. Moved to right breast in same, infant latched with minimal assistance. Infant exhibits deep jaw drops and occasional audible swallows. Initial breastfeeding packet given and reviewed. Breastfeeding book provided. Encourage mother to watch Loopube videos 1-3. Questions denied. Encouragement and support provided.     Instructed mom our lactation team is here for continued support throughout their breastfeeding journey. Our team has encouraged mom to call with any questions or concerns that may arise after discharge.     Breastfeeding and Diaper Chart  Check List for Essentials of Positioning And Latch-on handout provided by Lactation Education Resources  Hand Expression handout provided by Lactation Education Resources  Five Keys to Successful Breastfeeding  handout provided by Lactation Education Resources    The Many Benefits if Breastfeeding handout given  Breastfeeding saves time  *Breastfeeding allows you to calm or feed your baby immediately, which leads to a happier baby who cries less  *There is nothing to buy, prepare, or maintain.There is nothing to clean or sterilize.  Breastfeeding builds a mothers confidence  *She knows all her baby needs to thrive is her!  Breastfeeding saves Money  *There is no formula to buy and healthier breast fed babies have less medical costs  Healthy Mom/Healthy baby  * babies get sick less often, and when they do they are usually sick less severely and for a shorter time  * babies have fewer ear infections  * babies have fewer allergies  *Mothers who breastfeed have a lower risk for cancer, osteoporosis, anemia, high blood pressure, obesity, and Type ll diabetes  *Mothers miss less work days with sick babies  Breast fed babies have a better dental health  * babies have better jaw development which requires lest orthodontic work  *Breast milk does not promote cavities  * babies can nurse at night without worry of tooth decay  Breastfeeding allows a baby to reach his full IQ potential  *The longer a baby is breast fed, the better their brain development  Breast fed babies and moms are more relaxed  *The hormones released during breastfeeding have a calming effect on mothers  *Breastfeeding requires mom to take a break; this may help mom get more rest after delivery  *Breastfeeding is quicker than preparing formula which allows mom and baby to get back to sleep faster  *Breastfeeding promotes bonding and allows mom to learn babies cues and care needs more quickly  Breastfeeding cleanup is easier  *The bowel movements and spit up of breast fed babies doesn't smell as bad  *Spit-up of breast fed babies doesn't stain clothing  Getting out of the hourse is easier  *No formula bottles to prepare  and carry safely   *No time restraints due to worry about what baby will eat  *No worries about warming a bottle or finding safe water to prepare bottles  Breastfeeding mother get their bodies back sooner  *The uterus shrinks more quickly and completely, which allows a flatter tummy  *Breastfeeding burns 400-500 calories a day; making milk torches stored fat!  Breastfeeding is better for the environment  *There is no trash to dispose of after breastfeeding  *There is no production facility to produce breast milk; moms body does it all without the pollution of a factory    Educational Breastfeeding Videos on   YouTube  (length of video in minutes)    Expressing the First Milk - Small Baby Series (7:19)  Hand Expression [facility] (7:34)  Attaching Your Baby at the Breast - Breastfeeding Series (10:26)  The Power of Pumping - [facility]   Maximizing Production [facility] (9:35)  Instructions for use Medela Symphony breastpump (English) (1:58)  Medela 2-Phase Expression (4:05)  Medela double pumping video (2:19)  Choosing your PersonalFit breast shield size (3:04)  We also recommend visiting [email or web address].[email or web address] for valuable education and videos on breastfeeding full term AND  infants. This is a great resource to begin learning about breastfeeding during pregnancy as well.                [facility]             [phone]     Breastfeeding A Great Start Book by Paula Magana, THAO, LCCE, ICD and NBA Gilmore MD, FACOG    [employer] Breastfeeding Moms Group by [facility]    Freshly Expressed Breastmilk Storage Guidelines for Healthy Term Babies References: www.[email or web address]          Electronically signed by Cynthia Peters RN at 23 1908       Analy Olson MD at 23 1710          [facility]  Vaginal Delivery Note    Delivery details     Delivery: Vaginal, Spontaneous     YOB: 2025     Time of Birth: 5:01 PM      Anesthesia: Epidural     Delivering clinician: Analy Olson    Forceps?   No   Vacuum? No    Shoulder dystocia present: No      Delivery narrative:      Infant details    Findings: female  infant     Infant observations: Weight: 3420 g (7 lb 8.6 oz)   Length: 19.5  in   Apgars: 8  @ 1 minute /    9  @ 5 minutes     Placenta, Cord, and Fluid    Placenta delivered  Spontaneous  at   2023  5:03 PM     Cord: 3 vessels  present.   Nuchal Cord?  yes; Number of nuchal loops present:  2    Cord blood obtained: Yes      Repair    Episiotomy: None    Lacerations: No   Estimated Blood Loss:     Suture used for repair: N/a       Complications  none    Disposition  Mother to Mother Baby/Postpartum  in stable condition currently.  Baby to remains with mom  in stable condition currently.      Analy Olson MD  23  07:14 CDT    Electronically signed by Analy Olson MD at 23 0714          Operative/Procedure Notes (last 72 hours)        Analy Olson MD at 23 1773

## 2023-06-08 NOTE — OUTREACH NOTE
[facility]  IP Postpartum    Questions/Answers      Flowsheet Row Responses   Best Method for Contacting Cell   Support Person Present Yes   Does the patient have a car seat at the hospital Yes   Delivery Note Reviewed Reviewed   Were birth expectations met? Yes   Is there a need for additional support/resources? Yes, No   Lactation Note Reviewed Reviewed   Is additional support needed? No   Any questions or concerns? No   Is the patient going to use Meds to Beds? Yes   Any concerns related discharge meds/ability to  prescriptions? No   OB Discharge Navigator Reviewed  Reviewed   Confirm Postpartum OB appointment Yes   Postpartum OB appointment date 07/10/23   Confirm initial well-child Pediatrician appointment date/time: Yes   Initial Well Child Pediatrician Appointment Date 23   Additional post-discharge F/U appointments Yes   Post-Discharge F/U appointments details 2023 @ [address] (Lactation)   Does patient have transportation to appointments? Yes   Any other assistance needed to ensure she is able to attend appointments? No   Does patient have supplies needed at home for  care? Breast Pump, Clothing, Crib, Diapers, Formula            Information given to client related to Postpartum check-in call 2023 and [facility] reminder related to adding baby to feeding plan.          Lorrie Villeda RN  Maternity Nurse Navigator    2023, 10:02 CDT

## 2023-06-08 NOTE — PLAN OF CARE
Goal Outcome Evaluation:              Outcome Evaluation: VSS. FF/ML/U2 scant lochia. Pt ambulating; voiding WNL. Tylenol and Motrin for pain is effective. Breastfeeding and pumping as well as supplementing with formula. Bonding well with infant and pt plans for discharge today.

## 2023-06-08 NOTE — PROGRESS NOTES
"[facility]  Vaginal Delivery Progress Note    Subjective   Postpartum Day 2: Vaginal Delivery    The patient feels well.  Her pain is well controlled with prescribed pain medications.   She is ambulating well.  Patient describes her bleeding as thin lochia.    Breastfeeding: without difficulty.    Objective     Vital Signs Range for the last 24 hours  Temperature: Temp:  [97.2 °F (36.2 °C)-98 °F (36.7 °C)] 97.2 °F (36.2 °C)   Temp Source: Temp src: Temporal   BP: BP: (116-127)/(72-80) 127/80   Pulse: Heart Rate:  [74-82] 74   Respirations: Resp:  [16-18] 16   SPO2: SpO2:  [98 %-99 %] 98 %   O2 Amount (l/min):     O2 Devices Device (Oxygen Therapy): room air   Weight:       Admit Height:  Height: 167.6 cm (66\")      Physical Exam:  General:  no acute distresss.  Abdomen: Fundus: appropriate, firm, non tender  Extremities: normal, atraumatic, no cyanosis, and trace edema.       Lab results reviewed:  Yes   Rubella:  No results found for: RUBELLAIGGIN Nurse Transcribed from prenatal record --  No components found for: EXTRUBELQUAL  Rh Status:    RH type   Date Value Ref Range Status   06/06/2023 Positive  Final     Immunizations:   Immunization History   Administered Date(s) Administered    COVID-19 (UNSPECIFIED) 06/02/2021, 06/30/2021    Tdap 05/16/2018       Assessment & Plan       * No active hospital problems. *      Yuly Fernandes is Day 2  post-partum    Vaginal, Spontaneous     .      Plan:  Discharge home with standard precautions and return to clinic in 4-6 weeks.      Analy Olson MD  6/8/2023  09:47 CDT  "

## 2023-06-08 NOTE — LACTATION NOTE
Internal Medicine Progress Note      SUBJECTIVE     Patient seen and examined at the bedside. Patient resting comfortably in bed. Reports generalized itchiness that improves with topical benadryl.  No acute events overnight. Patient states that he has been able to tolerate his meals without difficulty. Patient has not experienced any chest pain, shortness of breath, abdominal pain, n/v/d.     Inpatient Meds  Current Facility-Administered Medications   Medication Dose Route Frequency Provider Last Rate Last Admin   • magnesium oxide (MAG-OX) tablet 400 mg  400 mg Oral Q8H Isac Mcgrawzamarleen       • benzonatate (TESSALON PERLES) capsule 100 mg  100 mg Oral TID PRN Terry English   100 mg at 04/06/22 2150   • ipratropium-albuterol (DUONEB) 0.5-2.5 (3) MG/3ML nebulizer solution 3 mL  3 mL Nebulization Q4H Resp PRN Abraham Armstrong   3 mL at 04/06/22 2205   • melatonin tablet 3 mg  3 mg Oral Nightly Abraham Armstrong   3 mg at 04/06/22 2151   • diphenhydrAMINE (BENADRYL) 2-0.1 % Liquid 1 application  1 application Topical TID PRN Terry English   1 application at 04/06/22 0805   • atorvastatin (LIPITOR) tablet 40 mg  40 mg Oral Daily Ngoc Manjarrez MD   40 mg at 04/06/22 0808   • folic acid (FOLATE) tablet 1 mg  1 mg Oral Daily Ngoc Manjarrez MD   1 mg at 04/06/22 0809   • levothyroxine (SYNTHROID, LEVOTHROID) tablet 112 mcg  112 mcg Oral Daily Ngoc Manjarrez MD   112 mcg at 04/06/22 0809   • metoPROLOL succinate (TOPROL-XL) ER tablet 100 mg  100 mg Oral Daily Ngoc Manjarrez MD   100 mg at 04/06/22 0809   • hydrOXYzine (ATARAX) tablet 10 mg  10 mg Oral Q6H PRN Ngoc Manjarrez MD   10 mg at 04/06/22 0819   • sodium chloride 0.9 % flush bag 25 mL  25 mL Intravenous PRN Ngoc Manjarrez MD       • sodium chloride (PF) 0.9 % injection 2 mL  2 mL Intracatheter 2 times per day Ngoc Manjarrez MD   2 mL at 04/06/22 2102   • [Held by provider] enoxaparin (LOVENOX) injection 40 mg  40 mg  Mother's Name: Yuly Phone #:[phone]  Infant Name: Asha  :2025  Gestation:39w3d  Day of life: 2  Birth weight:  7-8.6 (3420g) Discharge weight: 7-6 (3345g)  Weight Loss: -2.19%  24 hour Summary of Feeds:  4 BF + 6 formula feeds up to 54 ml Voids: 5 Stools: 6  Assistive devices (shields, shells, etc):NA  Significant Maternal history:, pumped and formula fed with 2 previous children for 2 & 5 Months d/t latching difficulty  Maternal Concerns:  denies  Maternal Goal: try breastfeeding, pump and supplement as needed  Mother's Medications: Iron, PNV, Vit B6  Breastpump for home: Spectra  Ped follow up appt:Oneyda    Patient states she last pumped 3-4 ml and feels her breasts are heavier today. Reviewed Discharge breastfeeding packet and offered continued support in OP Lactation.     Instructed mom our lactation team is here for continued support throughout their breastfeeding journey. Our team has encouraged mom to call with any questions or concerns that may arise after discharge.     Signs of Milk: Fullness, firmness, heaviness of breasts, leaking of milk.  Signs of Good Feed: Breast fullness prior to feed, breasts soft and comfortable after feeding. Infant content after feeding: calm, sleepy, relaxed and without continued hunger cues.  Signs of Plugged Ducts, Engorgement and Mastitis: Plugged ducts (milk entrapment in milk ducts)- small tender knots that often feel like little beans under breast tissue, usually tender. Massage on these areas of concern while breastfeeding or pumping to promote emptying.   Engorgement- fluid or excess milk, breasts become uncomfortably full, tight, firm (compare to the firmness of your cheek (mild), chin (moderate) or forehead (severe). First line of treatment should be to BREASTFEED, if breasts remain full feeling after a feeding, it may be necessary to pump, ONLY UNTIL BREASTS ARE SOFT AND COMFORTABLE. DO NOT OVER PUMP (complete emptying of breasts can trigger  Subcutaneous Daily RobertRANI English   40 mg at 04/05/22 0839   • lactated ringers infusion   Intravenous Continuous Ngoc Manjarrez MD 75 mL/hr at 04/06/22 2107 New Bag at 04/06/22 2107   • sodium chloride (NORMAL SALINE) 0.9 % bolus 500 mL  500 mL Intravenous PRN Ngoc Manjarrez MD       • cefTRIAXone (ROCEPHIN) syringe 1,000 mg  1 g Intravenous Daily Ngoc Manjarrez MD   1,000 mg at 04/06/22 0809      OBJECTIVE     VITAL SIGNS:     Vital Last Value 24 Hour Range   Temperature 97.9 °F (36.6 °C) (04/07/22 0518) Temp  Min: 97.9 °F (36.6 °C)  Max: 97.9 °F (36.6 °C)   Pulse 79 (04/07/22 0518) Pulse  Min: 74  Max: 82   Respiratory 18 (04/07/22 0518) Resp  Min: 16  Max: 18   Non-Invasive  Blood Pressure 108/63 (04/07/22 0518) BP  Min: 108/63  Max: 154/83   Pulse Oximetry 92 % (04/07/22 0518) SpO2  Min: 92 %  Max: 99 %       INTAKE/OUTPUT:  I/O last 3 completed shifts:  In: 2774.6 [I.V.:2774.6]  Out: 925 [Urine:925]  No intake/output data recorded.    Intake/Output Summary (Last 24 hours) at 4/7/2022 0824  Last data filed at 4/7/2022 0203  Gross per 24 hour   Intake 2774.62 ml   Output 625 ml   Net 2149.62 ml     PHYSICAL EXAM    Physical Exam  Constitutional:       General: He is not in acute distress.     Appearance: Normal appearance.   HENT:      Head: Normocephalic and atraumatic.      Neck: Normal range of motion and neck supple.   Eyes:      Extraocular Movements: Extraocular movements intact.      Conjunctiva/sclera: Conjunctivae normal.   Cardiovascular:      Rate and Rhythm: Normal rate and regular rhythm.      Heart sounds: Normal heart sounds.   Pulmonary:      Effort: Pulmonary effort is normal. No respiratory distress.      Breath sounds: Normal breath sounds. No wheezing or rales.   Abdominal:      General: Abdomen is flat. Bowel sounds are normal. There is no distension.      Palpations: Abdomen is soft.      Tenderness: There is no abdominal tenderness. There is no guarding.   Musculoskeletal:  even more milk which will cause continued, recurrent Engorgement).  Mastitis- Infection of the breast tissue, most often caused by plugged ducts that are not adequately treated by emptying, recurrent trauma to nipples or breasts (cracked or bleeding nipples). Signs: redness, swelling, tender knots or fever to breasts as well as generalized fever >101 degrees F that is often sudden onset. Treatment of mastitis, BREASTFEED! Pump after breastfeeding to achieve COMPLETE emptying of affected breast, utilizing massage to areas of concern, may use cold compress to affected area only after breast emptying. May take anti-inflammatories i.e. Ibuprofen, Motrin. CALL your OB for assessment and continued treatment with Antibiotics to adequately treat mastitis.  Infant Care: Over the first 2 weeks it is important to keep record of infant's feeding routine (feeding times and durations), wet and dirty diaper frequency, stool color and any spit ups that may occur.  Keep in mind, ALL babies will lose some weight initially (usually no more than 10% by day 3). Until infant returns to/ surpasses birth weight (which can take up to 2 weeks), it is important to offer feedings AT LEAST EVERY 3 HOURS. Remember, if you choose to supplement infant with formula or previously pumped milk, you should always pump in replacement of that feeding in order to promote and maintain a healthy milk supply!  Maternal Care: REST, sleep when the infant sleeps, stay hydrated (water is optimal) drink to thirst, increase caloric intake - breastfeeding mother's need an ADDITIONAL 500 calories per day , eat 3 meals/day as well as snacks in between, limit CAFFIENE intake to 2 cups/day. Ask your significant other, family members or friends for help when needed, taking advantage of meal trains, allowing others to help with laundry, house chores, etc can help you focus on what is most important early on after delivery… you and your infant, and breastfeeding!          General: No swelling.      Right lower leg: No edema.      Left lower leg: No edema.   Skin:     General: Skin is warm and dry.   Neurological:      General: No focal deficit present.      Mental Status: He is alert. Mental status is at baseline.   Psychiatric:         Mood and Affect: Mood normal.         Behavior: Behavior normal.       Labs     Recent Labs   Lab 04/07/22 0437 04/06/22  0449 04/05/22  0501 04/04/22  1416   SODIUM 137 138 142 139   POTASSIUM 3.8 3.8 3.1* 3.6   CHLORIDE 103 103 105 101   CO2 30 31 27 31   ANIONGAP 8* 8* 13 11   BUN 13 14 15 20   CREATININE 0.91 0.86 0.93 1.11   CALCIUM 9.3 9.1 9.4 10.8*   GLUCOSE 87 85 85 152*     Recent Labs   Lab 04/07/22 0437 04/06/22 0449 04/05/22  0501 04/04/22  1416   HGB 9.2* 8.9* 9.0* 10.8*   HCT 28.9* 28.7* 29.2* 34.2*    230 238 291   WBC 6.4 6.3 6.7 9.2     Recent Labs   Lab 04/07/22 0437 04/06/22  0449 04/05/22  0501   MG 1.4* 1.5* 1.3*     Recent Labs   Lab 04/05/22  1534   PT 12.0*   PTT 26   INR 1.1     Recent Labs   Lab 04/04/22  1416   ALBUMIN 1.7*   AST 55*     UA  Lab Results   Component Value Date    UWBC Large (A) 04/04/2022    URBC Small (A) 04/04/2022        Imaging  US ABDOMEN LIMITED   Final Result      Gallstone.  No gallbladder mass or polyp.      Fatty liver.      2.3 cm cystic lesion in the pancreatic head is better demonstrated on CT.      Electronically Signed by: GENARO SOSA M.D.    Signed on: 4/6/2022 9:04 PM          CT ABDOMEN PELVIS W CONTRAST   Final Result   1.    Interval development of multiple large retroperitoneal, gastrohepatic   ligament, retrocrural and right inguinal lymph nodes as above, presumably   reflecting lymphoma.    2.    Hypodense lesions throughout the spleen also presumably splenic   lymphoma.    3.    New soft tissue density nodular lesion within the gallbladder   suspicious for gallbladder mass. Advise dedicated right upper quadrant   ultrasound for further evaluation.   4.   Few    Medications to CONTINUE: Prenatal Vitamins are important to continue taking while breastfeeding. Fish oil, magnesium/calcium supplements often are helpful to support Mothers and their milk supply as well. Tylenol, Ibuprofen, regular Zyrtec, Claritin are SAFE if you suffer from seasonal allergies. Flonase is safe and often an effective medication to take if suffering from sinus drainage/pressure.  Medications to AVOID: Benadryl, Sudafed, any medications including “DM” or have a drying effect to sinus drainage will also dry a mother's milk up. Birth control- your OB will want to address birth control options with you usually around 4-6 weeks postpartum, be sure to notify your MD if you continue to breastfeed as some birth controls may significantly decrease your milk supply. Herbals- some herbs may also decrease your milk supply: PEPPERMINT, MENTHOL in any form (candies, essential oils, teas, etc), so check labels and avoid using in excess.  Pumping: Although we encourage you to focus on breastfeeding over the first 2-4 weeks, you will need to plan to begin pumping. We do recommend implementing pumping by the time infant is 4 weeks old. Pump 2-3 times per day immediately AFTER breastfeeding, it is normal to collect very small amounts initially, but the more consistently you pump, the more you will begin to collect. Store collected milk in refrigerator or freezer. You should also begin offering infant a bottle around 4 weeks. Remember to use slow flow nipples and PACE the bottle-feed. A bottle feed should take about as long as a breastfeeding session.        low-density pancreatic lesions warrant continued annual follow-up.      Electronically Signed by: REG MUÑOZ M.D.    Signed on: 4/6/2022 1:53 PM          CTA CHEST PULMONARY EMBOLISM W CONTRAST   Final Result      1.   New mildly spiculated left hilar mass measuring 4.7 x 4.5 x 5.5 cm,   concerning for primary bronchogenic neoplasm with lymphomatous involvement   considered less likely, but not excluded.   2.   Numerous new bilateral pulmonary nodules measuring up to 1.3 cm,   concerning for metastasis.   3.   Enlarged left retrocrural lymph nodes, suspicious for metastatic   disease.   4.   Additional enlarged mediastinal and shena hepatis/portacaval lymph   nodes are similar in appearance to the prior examination.  These may   reflect treated lymphoma.   5.   Cholelithiasis.   6.   Stable appearance of partially imaged 1.9 cm cyst in the pancreatic   head.      Dictated by: Eric Strauss   Dictated on: 4/4/2022 4:18 PM       IBUD M.D., have reviewed the images and report and concur with   these findings interpreted by Eric Strauss.      Electronically Signed by: BUD JO M.D.    Signed on: 4/4/2022 5:01 PM          XR CHEST PA OR AP 1 VIEW   Final Result       Normal heart size.  Atherosclerotic aorta.  No mediastinal widening.    Ill-defined opacities left mid lung field new compared to prior study.  Few   coarse interstitial markings left lower lung field slightly worsened.  Mild   haziness left lung base with small left effusion not excluded.  Follow-up   chest x-ray and/or CT chest recommended for further evaluation.         Electronically Signed by: LACEY GARCIA M.D.    Signed on: 4/4/2022 1:24 PM            ASSESSMENT AND PLAN     The patient is an 84-year-old patient with PMHx of Hodgkin's lymphoma in remission, BPH s/p TURP with bladder stimulator, HTN, HLD, and hypothyroidism who presented to the ED on 4/4 due to generalized weakness, fatigue, lightheadedness and decreased  appetite for 1 week. Patient admitted to [facility] for further workup and management.      #Decreased appetite, generalized weakness, fatigue  #New spiculated L hilar lung mass  - Presented to ED w/ decreased appetite, weakness, fatigue, lightheadedness x1 week  - Regularly uses walker at home  - ED Labs Na: 139 K: 3.6 Cr: 1.11 Alb: 1.7 Procal: 0.30 WBC: 9.2 Hb: 10.8 PLT: 291  - Imaging: CXR: few coarse interstitial markings, CT-PE: new mildly spiculated left hilar mass (4.7 x 4.5 x 5.5 cm, concerning for primary bronchogenic neoplasm), numerous new bilateral pulmonary nodules  - Heme/onc consulted: CT A/P obtained for staging (multiple large retroperitoneal, gastrohepatic, retrocrural and R inguinal lymph notes, hypodense lesions throughout spleen, soft tissue density in gallbladder), will need tissue biopsy (recommended IR biopsy, IR recommended EBUS biopsy)  - Abdominal U/S obtained to evaluate gallbladder lesion: gallstones, no gallbladder mass  - Confirmed with heme/onc to determine best site for biopsy: plan to move forward with EBUS likely tomorrow 4/8 (pending cardiology clearance)   - Cardiology consulted for procedure clearance: echo obtained showing EF 45% (grade 1 diastolic dysfunction), patient will undergo stress test today  - PT/OT: recommend patient receive intermittent assistance at home, appropriate for therapy 3x/week  - Nutrition consulted: continue cardiac diet, add Ensure  - NPO after midnight prior to EBUS     #History of refractory Hodgkin lymphoma  - Diagnosed in 2011  - In surveillance since 8/2013  - Follows with Dr. Perez   - Continue home folic acid 1 mg QD     #UTI  - No dysuria  - Unclear frequency urgency as patient treated with AXONICS modulator   - UA: LE: Large, bacterial: Few, WBC: >100  - Urine culture growing >100k Klebsiella and alpha hemolytic strep (no resistance noted for sensitivities)   - Will continue ceftriaxone    #Hypomagnesemia   - Mg 1.3>1.5>1.4 this AM  - Will replete  and CTM    #Hypokalemia  - 3.6 on admission > 3.1 > 3.8 this AM after repletion  - Will CTM     #Overreactive bladder  #BPH  - S/p TURP  - AXONICS modulator placed 9/10/2021 on [employer]  - Mentions difficult regulating urge to urinate in the last couple of weeks     #Hypothyroidism  - Continue home levothyroxine 112 mcg QD     #Hypertension  - Continue home metoprolol 100 mg QD      #Hyperlipidemia  - Continue lipitor 40 mg QD     FEN: Monitor and replete lytes as needed.   DVT PPX: Lovenox (currently held for potential biopsy procedure)  ACTIVITY: Advance per baseline.   CODE STATUS: Full    Disposition: 541    Discussed patient with attending, Dr. Edgar. Please await final recommendations.    Note co-written with:  Nedra Mathur, MS4    Edited and reviewed by:     Isac Mendieta M.D  PGY 1 Internal Medicine  Please contact via [employer]    4/7/2022 8:24 AM

## 2023-06-13 ENCOUNTER — PATIENT OUTREACH (OUTPATIENT)
Dept: LABOR AND DELIVERY | Facility: HOSPITAL | Age: 23
End: 2023-06-13
Payer: COMMERCIAL

## 2023-06-13 NOTE — OUTREACH NOTE
[facility]  Postpartum Check-In    Questions/Answers      Flowsheet Row Responses   Visit Setting Telephone   Best Method for Contacting Cell   OB Discharge Note Reviewed  Reviewed   OB Discharge Navigator Reviewed  Reviewed   OB Discharge Medications Reviewed  Reviewed    discharged home with mother? Yes   Current Pain Levels 0-10 0   At Rest Pain Levels 0-10 0   Pain level with activity 0-10 2   Acceptable Pain Level 0-10 3   Pain Location Abdomen   Pain Description Cramping   How do you treat your pain? Medications, Position Changes, Pillow Support   Verbalized Emotional State Acceptance   Family/Support Network Family   Level of Involvement in Care Attentive, Interactive, Supportive   Do you feel comfortable in your relationship with your baby? Yes   Have members of your household adjusted to your baby? Yes   Is the baby's father supportive and/or involved with the baby? Yes   How does your partner feel about the baby? Happy, Involved   Do you feel safe at home, school and work? Yes   Do you have the resources to keep yourself and your baby healthy and safe? Yes   Lochia (per patient report) Rubra   Amount Scant   Number of pads per day 3   Lochia Odor None   Is patient breastfeeding? Yes, pumping   Postpartum Depression Screening Education Education Provided   Doctor Appointments: Education Provided   Postpartum Care Education Education Provided   S & S to report Education Provided   Followup Appointments Made Yes   Well Child Visit Appointments Made Yes   Appointment Date 07/10/23   Provider/Agency [facility]   Well Child Checkup Provider Name Rociomaciej Marin   Well Child Check Up Date: 23   Umbilical Cord No reported signs or symptoms   Feeding Readiness Cues: Cooing, Crying, Eager, Energy for feeding, Finger Sucking   Infant Feeding Method Expressed Breast Milk, Formula   Formula PO (mL) 2-3 oz   Formula/Expressed Milk frequency of feedings: prn   Expressed milk PO (mL) 2-3 oz   Expressed  milk- frequency of feedings every 2-3 hours   Number of wet diapers x 24 hours 10   Last BM x 24 hours 1-2   Emesis (Unmeasured Occurence) emesis   What safe sleep surface is available? [place]   Are there stuffed animals, toys, pillows, quilts, blankets, wedges, positioners, bumpers or other loose bedding in the infant's sleeping environment? No   Where does the baby usually sleep? [place]   Does the baby ever share a sleep surface with a sibling, adult or pet? No   Does the baby ever share a sleep surface in a bed, couch, recliner or other? No   What position do you place your baby to sleep for naps? Back   What position do you place your baby to sleep at night Back            Review of Systems    Most Recent [place]  Depression Scale Score (EPDS)    Performed by a clinician: 3 (2023  2:52 PM)    Client is aware that the RN call center will be calling in 1-2 weeks    Lorrie Villeda RN  Maternity Nurse Navigator    2023, 14:56 CDT

## 2023-10-26 ENCOUNTER — OFFICE VISIT (OUTPATIENT)
Dept: FAMILY MEDICINE CLINIC | Facility: CLINIC | Age: 23
End: 2023-10-26
Payer: COMMERCIAL

## 2023-10-26 VITALS
RESPIRATION RATE: 18 BRPM | SYSTOLIC BLOOD PRESSURE: 129 MMHG | HEIGHT: 66 IN | OXYGEN SATURATION: 98 % | TEMPERATURE: 97.6 F | WEIGHT: 172.25 LBS | BODY MASS INDEX: 27.68 KG/M2 | HEART RATE: 67 BPM | DIASTOLIC BLOOD PRESSURE: 87 MMHG

## 2023-10-26 DIAGNOSIS — J35.8 TONSIL STONE: ICD-10-CM

## 2023-10-26 DIAGNOSIS — R19.7 DIARRHEA, UNSPECIFIED TYPE: ICD-10-CM

## 2023-10-26 DIAGNOSIS — R11.2 NAUSEA AND VOMITING, UNSPECIFIED VOMITING TYPE: ICD-10-CM

## 2023-10-26 DIAGNOSIS — R11.0 NAUSEA: Primary | ICD-10-CM

## 2023-10-26 LAB
EXPIRATION DATE: NORMAL
FLUAV AG NPH QL: NEGATIVE
FLUBV AG NPH QL: NEGATIVE
INTERNAL CONTROL: NORMAL
Lab: NORMAL
RSV AG SPEC QL: NEGATIVE
S PYO RRNA THROAT QL PROBE: NEGATIVE
SARS-COV-2 AG UPPER RESP QL IA.RAPID: NOT DETECTED

## 2023-10-26 RX ORDER — NORETHINDRONE ACETATE AND ETHINYL ESTRADIOL 1MG-20(21)
1 KIT ORAL DAILY
COMMUNITY
Start: 2023-08-26

## 2023-10-26 RX ORDER — BUSPIRONE HYDROCHLORIDE 7.5 MG/1
1 TABLET ORAL 3 TIMES DAILY
COMMUNITY
Start: 2023-07-10

## 2023-10-26 NOTE — PROGRESS NOTES
Lavern Pappas APRASHOK  [facility]   Family Medicine  [address]  [place] [address]  Phone: [phone]  Fax: [phone]         Chief Complaint:  Chief Complaint   Patient presents with    Diarrhea    Vomiting    Headache        History:  Yuly Fernandes is a 23 y.o. female that is an established patient. She  is here for evaluation of the above complaint.    The patient presents today with complaints of diarrhea, vomiting, and headache that started last night around 7:00 PM.    Vomiting and nausea  The patient thought that she may have had food poisoning. She states that 2 hours after she at, she started vomiting. Her daughters ate from the same restaurant, but had different food; they did not have similar symptoms. She denies vomiting this morning, but reports nausea. She denies fever.     Headache  The patient reports headache today.    Diarrhea   Associated symptoms include headaches and vomiting. Pertinent negatives include no abdominal pain, fever or myalgias.   Vomiting   Associated symptoms include diarrhea and headaches. Pertinent negatives include no abdominal pain, chest pain, dizziness, fever or myalgias.   Headache          ROS:  Review of Systems   Constitutional:  Negative for fatigue, fever and unexpected weight change.   HENT:  Negative for congestion, ear pain, rhinorrhea, sinus pressure, sinus pain and voice change.    Eyes:  Negative for visual disturbance.   Respiratory:  Negative for shortness of breath and wheezing.    Cardiovascular:  Negative for chest pain and palpitations.   Gastrointestinal:  Positive for diarrhea, nausea and vomiting. Negative for abdominal pain.   Genitourinary:  Negative for dysuria and flank pain.   Musculoskeletal:  Negative for back pain, myalgias and neck pain.   Skin:  Negative for color change and rash.   Neurological:  Positive for headaches. Negative for dizziness, weakness and numbness.   Psychiatric/Behavioral:   "Negative for behavioral problems, dysphoric mood, self-injury and sleep disturbance.         reports that she has never smoked. She has never been exposed to tobacco smoke. She has never used smokeless tobacco. She reports that she does not currently use alcohol. She reports that she does not use drugs.    Current Outpatient Medications   Medication Instructions    Blisovi FE 1/20 1-20 MG-MCG per tablet 1 tablet, Oral, Daily    busPIRone (BUSPAR) 7.5 MG tablet 1 tablet, Oral, 3 times daily    FeroSul 325 (65 Fe) MG tablet 1 tablet, 3 Times Daily    ibuprofen (ADVIL,MOTRIN) 600 mg, Oral, Every 6 Hours PRN    Prenatal Vit-Fe Fumarate-FA (prenatal vitamin 27-0.8) 27-0.8 MG tablet tablet 1 tablet, Daily    vitamin B-6 (PYRIDOXINE) 25 mg, Daily       OBJECTIVE:  /87 (BP Location: Left arm, Patient Position: Sitting, Cuff Size: Adult)   Pulse 67   Temp 97.6 °F (36.4 °C) (Infrared)   Resp 18   Ht 167.6 cm (65.98\")   Wt 78.1 kg (172 lb 4 oz)   SpO2 98%   BMI 27.82 kg/m²    Physical Exam  Vitals and nursing note reviewed.   Constitutional:       Appearance: Normal appearance. She is well-developed. She is ill-appearing.   HENT:      Head: Normocephalic and atraumatic.      Right Ear: Tympanic membrane, ear canal and external ear normal.      Left Ear: Tympanic membrane, ear canal and external ear normal.      Nose: Nose normal. No septal deviation, nasal tenderness or congestion.      Mouth/Throat:      Lips: Pink. No lesions.      Mouth: Mucous membranes are moist. No oral lesions.      Dentition: Normal dentition.      Pharynx: Oropharynx is clear. No pharyngeal swelling, oropharyngeal exudate or posterior oropharyngeal erythema.     Eyes:      General: Lids are normal. Vision grossly intact. No scleral icterus.        Right eye: No discharge.         Left eye: No discharge.      Extraocular Movements: Extraocular movements intact.      Conjunctiva/sclera: Conjunctivae normal.      Right eye: Right conjunctiva " is not injected.      Left eye: Left conjunctiva is not injected.      Pupils: Pupils are equal, round, and reactive to light.   Neck:      Thyroid: No thyroid mass.      Trachea: Trachea normal.   Cardiovascular:      Rate and Rhythm: Normal rate and regular rhythm.      Heart sounds: Normal heart sounds. No murmur heard.     No gallop.   Pulmonary:      Effort: Pulmonary effort is normal.      Breath sounds: Normal breath sounds and air entry. No wheezing, rhonchi or rales.   Musculoskeletal:         General: No tenderness or deformity. Normal range of motion.      Cervical back: Full passive range of motion without pain, normal range of motion and neck supple.      Thoracic back: Normal.      Right lower leg: No edema.      Left lower leg: No edema.   Skin:     General: Skin is warm and dry.      Coloration: Skin is not jaundiced.      Findings: No rash.   Neurological:      Mental Status: She is alert and oriented to person, place, and time.      Sensory: Sensation is intact.      Motor: Motor function is intact.      Coordination: Coordination is intact.      Gait: Gait is intact.      Deep Tendon Reflexes: Reflexes are normal and symmetric.   Psychiatric:         Mood and Affect: Mood and affect normal.         Behavior: Behavior normal.         Judgment: Judgment normal.         Procedures    Assessment/Plan:     Diagnoses and all orders for this visit:    1. Nausea (Primary)  -     POC Influenza A / B  -     POCT SARS-CoV-2 Antigen STEPHANIE  -     POCT Strep A, molecular  -     POCT respiratory syncytial virus    2. Diarrhea, unspecified type  Comments:  Influenza, strep, and COVID-19 tests ordered today.  Orders:  -     POC Influenza A / B  -     POCT SARS-CoV-2 Antigen STEPHANIE  -     POCT Strep A, molecular  -     POCT respiratory syncytial virus    3. Nausea and vomiting, unspecified vomiting type  Comments:  Influenza, strep, and COVID-19 tests ordered today.  Orders:  -     POC Influenza A / B  -     POCT  SARS-CoV-2 Antigen STEPHANIE  -     POCT Strep A, molecular  -     POCT respiratory syncytial virus    4. Tonsil stone    Tonsil stone  Patient will gargle with peroxide and water.    BMI is >= 25 and <30. (Overweight) The following options were offered after discussion;: nutrition counseling/recommendations    An After Visit Summary was printed and given to the patient at discharge.  No follow-ups on file.       There are no Patient Instructions on file for this visit.      Discussion: Will plan for rectal swab to test for samonella if influenza, strep, and COVID-19 tests are negative. Discussed today's common symptoms of influenza A.    I spent 30 minutes caring for Yuly on this date of service. This time includes time spent by me in the following activities: preparing for the visit, reviewing tests, obtaining and/or reviewing a separately obtained history, performing a medically appropriate examination and/or evaluation, counseling and educating the patient/family/caregiver, ordering medications, tests, or procedures, documenting information in the medical record, independently interpreting results and communicating that information with the patient/family/caregiver, and care coordination     Lavern KING 10/27/2023   Electronically signed.    Transcribed from ambient dictation for FERNANDO Yadav by Lenore Elias.  10/26/23   12:29 CDT    Patient or patient representative verbalized consent to the visit recording.  I have personally performed the services described in this document as transcribed by the above individual, and it is both accurate and complete.

## 2023-11-20 ENCOUNTER — APPOINTMENT (OUTPATIENT)
Dept: CT IMAGING | Facility: HOSPITAL | Age: 23
End: 2023-11-20
Payer: COMMERCIAL

## 2023-11-20 ENCOUNTER — HOSPITAL ENCOUNTER (EMERGENCY)
Facility: HOSPITAL | Age: 23
Discharge: HOME | End: 2023-11-20
Attending: EMERGENCY MEDICINE | Admitting: EMERGENCY MEDICINE
Payer: COMMERCIAL

## 2023-11-20 VITALS
BODY MASS INDEX: 26.52 KG/M2 | TEMPERATURE: 97.8 F | SYSTOLIC BLOOD PRESSURE: 124 MMHG | RESPIRATION RATE: 20 BRPM | HEIGHT: 66 IN | DIASTOLIC BLOOD PRESSURE: 76 MMHG | WEIGHT: 165 LBS | OXYGEN SATURATION: 99 % | HEART RATE: 94 BPM

## 2023-11-20 DIAGNOSIS — N20.0 KIDNEY STONE: Primary | ICD-10-CM

## 2023-11-20 LAB
ANION GAP SERPL CALCULATED.3IONS-SCNC: 12 MMOL/L (ref 5–15)
B-HCG UR QL: NEGATIVE
BACTERIA UR QL AUTO: ABNORMAL /HPF
BASOPHILS # BLD AUTO: 0.06 10*3/MM3 (ref 0–0.2)
BASOPHILS NFR BLD AUTO: 0.6 % (ref 0–1.5)
BILIRUB UR QL STRIP: NEGATIVE
BUN SERPL-MCNC: 16 MG/DL (ref 6–20)
BUN/CREAT SERPL: 20 (ref 7–25)
CALCIUM SPEC-SCNC: 9.8 MG/DL (ref 8.6–10.5)
CHLORIDE SERPL-SCNC: 104 MMOL/L (ref 98–107)
CLARITY UR: CLEAR
CO2 SERPL-SCNC: 24 MMOL/L (ref 22–29)
COLOR UR: YELLOW
CREAT SERPL-MCNC: 0.8 MG/DL (ref 0.57–1)
DEPRECATED RDW RBC AUTO: 42.2 FL (ref 37–54)
EGFRCR SERPLBLD CKD-EPI 2021: 105 ML/MIN/1.73
EOSINOPHIL # BLD AUTO: 0.23 10*3/MM3 (ref 0–0.4)
EOSINOPHIL NFR BLD AUTO: 2.3 % (ref 0.3–6.2)
ERYTHROCYTE [DISTWIDTH] IN BLOOD BY AUTOMATED COUNT: 12.9 % (ref 12.3–15.4)
EXPIRATION DATE: NORMAL
GLUCOSE SERPL-MCNC: 136 MG/DL (ref 65–99)
GLUCOSE UR STRIP-MCNC: NEGATIVE MG/DL
HCT VFR BLD AUTO: 38.5 % (ref 34–46.6)
HGB BLD-MCNC: 12.8 G/DL (ref 12–15.9)
HGB UR QL STRIP.AUTO: ABNORMAL
HOLD SPECIMEN: NORMAL
HOLD SPECIMEN: NORMAL
HYALINE CASTS UR QL AUTO: ABNORMAL /LPF
IMM GRANULOCYTES # BLD AUTO: 0.01 10*3/MM3 (ref 0–0.05)
IMM GRANULOCYTES NFR BLD AUTO: 0.1 % (ref 0–0.5)
INTERNAL NEGATIVE CONTROL: NEGATIVE
INTERNAL POSITIVE CONTROL: POSITIVE
KETONES UR QL STRIP: NEGATIVE
LEUKOCYTE ESTERASE UR QL STRIP.AUTO: ABNORMAL
LYMPHOCYTES # BLD AUTO: 4.64 10*3/MM3 (ref 0.7–3.1)
LYMPHOCYTES NFR BLD AUTO: 47.4 % (ref 19.6–45.3)
Lab: NORMAL
MCH RBC QN AUTO: 30 PG (ref 26.6–33)
MCHC RBC AUTO-ENTMCNC: 33.2 G/DL (ref 31.5–35.7)
MCV RBC AUTO: 90.2 FL (ref 79–97)
MONOCYTES # BLD AUTO: 0.85 10*3/MM3 (ref 0.1–0.9)
MONOCYTES NFR BLD AUTO: 8.7 % (ref 5–12)
NEUTROPHILS NFR BLD AUTO: 4 10*3/MM3 (ref 1.7–7)
NEUTROPHILS NFR BLD AUTO: 40.9 % (ref 42.7–76)
NITRITE UR QL STRIP: NEGATIVE
NRBC BLD AUTO-RTO: 0 /100 WBC (ref 0–0.2)
PH UR STRIP.AUTO: 5.5 [PH] (ref 5–8)
PLATELET # BLD AUTO: 195 10*3/MM3 (ref 140–450)
PMV BLD AUTO: 11.9 FL (ref 6–12)
POTASSIUM SERPL-SCNC: 3.4 MMOL/L (ref 3.5–5.2)
PROT UR QL STRIP: ABNORMAL
RBC # BLD AUTO: 4.27 10*6/MM3 (ref 3.77–5.28)
RBC # UR STRIP: ABNORMAL /HPF
REF LAB TEST METHOD: ABNORMAL
SODIUM SERPL-SCNC: 140 MMOL/L (ref 136–145)
SP GR UR STRIP: 1.02 (ref 1–1.03)
SQUAMOUS #/AREA URNS HPF: ABNORMAL /HPF
UROBILINOGEN UR QL STRIP: ABNORMAL
WBC # UR STRIP: ABNORMAL /HPF
WBC NRBC COR # BLD AUTO: 9.79 10*3/MM3 (ref 3.4–10.8)
WHOLE BLOOD HOLD COAG: NORMAL
WHOLE BLOOD HOLD SPECIMEN: NORMAL

## 2023-11-20 PROCEDURE — 25010000002 KETOROLAC TROMETHAMINE PER 15 MG: Performed by: EMERGENCY MEDICINE

## 2023-11-20 PROCEDURE — 81025 URINE PREGNANCY TEST: CPT | Performed by: EMERGENCY MEDICINE

## 2023-11-20 PROCEDURE — 80048 BASIC METABOLIC PNL TOTAL CA: CPT | Performed by: EMERGENCY MEDICINE

## 2023-11-20 PROCEDURE — 25810000003 SODIUM CHLORIDE 0.9 % SOLUTION: Performed by: EMERGENCY MEDICINE

## 2023-11-20 PROCEDURE — 74176 CT ABD & PELVIS W/O CONTRAST: CPT

## 2023-11-20 PROCEDURE — 85025 COMPLETE CBC W/AUTO DIFF WBC: CPT | Performed by: EMERGENCY MEDICINE

## 2023-11-20 PROCEDURE — 96374 THER/PROPH/DIAG INJ IV PUSH: CPT

## 2023-11-20 PROCEDURE — 81001 URINALYSIS AUTO W/SCOPE: CPT | Performed by: EMERGENCY MEDICINE

## 2023-11-20 PROCEDURE — 99284 EMERGENCY DEPT VISIT MOD MDM: CPT

## 2023-11-20 RX ORDER — KETOROLAC TROMETHAMINE 30 MG/ML
30 INJECTION, SOLUTION INTRAMUSCULAR; INTRAVENOUS ONCE
Status: COMPLETED | OUTPATIENT
Start: 2023-11-20 | End: 2023-11-20

## 2023-11-20 RX ORDER — ONDANSETRON 4 MG/1
4 TABLET, ORALLY DISINTEGRATING ORAL EVERY 8 HOURS PRN
Qty: 10 TABLET | Refills: 0 | Status: SHIPPED | OUTPATIENT
Start: 2023-11-20

## 2023-11-20 RX ORDER — TRAMADOL HYDROCHLORIDE 50 MG/1
50 TABLET ORAL EVERY 6 HOURS PRN
Qty: 10 TABLET | Refills: 0 | Status: SHIPPED | OUTPATIENT
Start: 2023-11-20 | End: 2023-11-27 | Stop reason: SDUPTHER

## 2023-11-20 RX ORDER — TAMSULOSIN HYDROCHLORIDE 0.4 MG/1
1 CAPSULE ORAL DAILY
Qty: 4 CAPSULE | Refills: 0 | Status: SHIPPED | OUTPATIENT
Start: 2023-11-20

## 2023-11-20 RX ADMIN — SODIUM CHLORIDE 1000 ML: 9 INJECTION, SOLUTION INTRAVENOUS at 03:29

## 2023-11-20 RX ADMIN — KETOROLAC TROMETHAMINE 30 MG: 30 INJECTION, SOLUTION INTRAMUSCULAR; INTRAVENOUS at 03:29

## 2023-11-20 NOTE — ED PROVIDER NOTES
Subjective   History of Present Illness  Pt presents to the  with report of R flank/R lower abdominal pain - states felt like a pressure for the past couple of days but became more severe this am.  States she felt like she had a UTI.  No hematuria.  No vaginal dicharge/bleeding.  Has had some diarrhea and nausea.  No injuries.  No f/c.         Review of Systems   Constitutional:  Negative for chills and fever.   Respiratory:  Negative for cough.    Gastrointestinal:  Positive for abdominal pain, diarrhea and nausea.   Genitourinary:  Positive for flank pain and urgency. Negative for vaginal bleeding and vaginal discharge.   Musculoskeletal:  Positive for back pain.   Skin:  Negative for rash.   All other systems reviewed and are negative.      Past Medical History:   Diagnosis Date    Urinary tract infection        No Known Allergies    History reviewed. No pertinent surgical history.    Family History   Problem Relation Age of Onset    Diabetes Maternal Great-Grandfather     Dementia Maternal Great-Grandfather        Social History     Socioeconomic History    Marital status:    Tobacco Use    Smoking status: Never     Passive exposure: Never    Smokeless tobacco: Never   Vaping Use    Vaping Use: Never used   Substance and Sexual Activity    Alcohol use: Not Currently     Comment: occasionally    Drug use: No    Sexual activity: Yes     Partners: Male     Birth control/protection: None, OCP           Objective   Physical Exam  Vitals and nursing note reviewed.   Constitutional:       General: She is not in acute distress.     Appearance: Normal appearance.   HENT:      Head: Normocephalic.   Cardiovascular:      Rate and Rhythm: Normal rate and regular rhythm.      Pulses: Normal pulses.      Heart sounds: Normal heart sounds.   Pulmonary:      Effort: Pulmonary effort is normal.      Breath sounds: Normal breath sounds.   Abdominal:      General: Abdomen is flat. Bowel sounds are normal.      Palpations:  Abdomen is soft.      Tenderness: There is no guarding or rebound.   Skin:     General: Skin is warm and dry.   Neurological:      Mental Status: She is alert.         Procedures           ED Course      Labs Reviewed   BASIC METABOLIC PANEL - Abnormal; Notable for the following components:       Result Value    Glucose 136 (*)     Potassium 3.4 (*)     All other components within normal limits    Narrative:     GFR Normal >60  Chronic Kidney Disease <60  Kidney Failure <15     URINALYSIS W/ MICROSCOPIC IF INDICATED (NO CULTURE) - Abnormal; Notable for the following components:    Blood, UA Large (3+) (*)     Protein, UA Trace (*)     Leuk Esterase, UA Trace (*)     All other components within normal limits   CBC WITH AUTO DIFFERENTIAL - Abnormal; Notable for the following components:    Neutrophil % 40.9 (*)     Lymphocyte % 47.4 (*)     Lymphocytes, Absolute 4.64 (*)     All other components within normal limits   URINALYSIS, MICROSCOPIC ONLY - Abnormal; Notable for the following components:    RBC, UA Too Numerous to Count (*)     WBC, UA 11-20 (*)     Squamous Epithelial Cells, UA 3-6 (*)     All other components within normal limits   POCT PEFORM URINE PREGNANCY - Normal   RAINBOW DRAW    Narrative:     The following orders were created for panel order [place] Draw.  Procedure                               Abnormality         Status                     ---------                               -----------         ------                     Green Top (Gel)[[id number]]                                  Final result               Lavender Top[[id number]]                                     Final result               Red Top[[id number]]                                          Final result               Light Blue Top[[id number]]                                   Final result                 Please view results for these tests on the individual orders.   GREEN TOP   LAVENDER TOP   RED TOP   LIGHT BLUE TOP   CBC AND  DIFFERENTIAL    Narrative:     The following orders were created for panel order CBC & Differential.  Procedure                               Abnormality         Status                     ---------                               -----------         ------                     CBC Auto Differential[[id number]]        Abnormal            Final result                 Please view results for these tests on the individual orders.                                          Medical Decision Making  Pt stable in EC - improved after IV toradol.  Resting comfortably att.  No evid of obstruction/appy.  No evid of TOA/torsion/PID.    + R ureteral stone.  No evid of infection.  Will d/c to home with pain/nausea control and flomax.  Prec given - recommend outpt f/u    Amount and/or Complexity of Data Reviewed  Labs: ordered.  Radiology: ordered.    Risk  Prescription drug management.        Final diagnoses:   Kidney stone       ED Disposition  ED Disposition       ED Disposition   Discharge    Condition   Stable    Comment   --               Stephen Isaac MD  [address]  [facility] [address] [phone]    Call            Medication List        New Prescriptions      ondansetron ODT 4 MG disintegrating tablet  Commonly known as: ZOFRAN-ODT  Place 1 tablet on the tongue Every 8 (Eight) Hours As Needed for Nausea or Vomiting.     tamsulosin 0.4 MG capsule 24 hr capsule  Commonly known as: FLOMAX  Take 1 capsule by mouth Daily.     traMADol 50 MG tablet  Commonly known as: ULTRAM  Take 1 tablet by mouth Every 6 (Six) Hours As Needed for Moderate Pain.               Where to Get Your Medications        These medications were sent to [id number] - [place], [place] - [address] - [phone]  - [phone] FX  [address], [facility] [address]      Phone: [phone]   ondansetron ODT 4 MG disintegrating tablet  tamsulosin 0.4 MG capsule 24 hr  capsule  traMADol 50 MG tablet            Clay Frank,   11/20/23 0329       Clay Frank,   11/20/23 0530

## 2023-11-21 ENCOUNTER — TELEPHONE (OUTPATIENT)
Dept: UROLOGY | Facility: CLINIC | Age: 23
End: 2023-11-21
Payer: COMMERCIAL

## 2023-11-21 NOTE — PROGRESS NOTES
Subjective    Ms. Fernandes is 23 y.o. female    Chief Complaint: Kidney stone    History of Present Illness  Urolithiasis  Patient complains of right abdominal pain and flank pain with radiation to the abdomen. Onset of symptoms was abrupt starting 7 days ago with unchanged course since that time. Patient describes the pain as aching and colicky,  pain pattern is not regular commonly goes.  and rated as moderate. The patient has had no nausea and no vomiting. There has been no fever and  no chills. The patient is not complaining of dysuria, frequency, or urgency.  Previous management of stones includes spontaneous passage.  CT scan done 11/28/2023 revealed a 4 mm stone in the distal right ureter with moderate hydro.  No other obvious stones are seen.  Labs showed normal white blood cell count normal creatinine.  Urine is positive for hematuria but is not infected looking.  Given tramadol Zofran and tamsulosin.  She still having intermittent pain at this time.  The following portions of the patient's history were reviewed and updated as appropriate: allergies, current medications, past family history, past medical history, past social history, past surgical history and problem list.    Review of Systems      Current Outpatient Medications:     Blisovi FE 1/20 1-20 MG-MCG per tablet, Take 1 tablet by mouth Daily., Disp: , Rfl:     busPIRone (BUSPAR) 7.5 MG tablet, Take 1 tablet by mouth 3 times a day., Disp: , Rfl:     FeroSul 325 (65 Fe) MG tablet, Take 1 tablet by mouth 3 (Three) Times a Day., Disp: , Rfl:     ibuprofen (ADVIL,MOTRIN) 600 MG tablet, Take 1 tablet by mouth Every 6 (Six) Hours As Needed for Mild Pain (First Line: Mild pain.)., Disp: 60 tablet, Rfl: 2    ondansetron ODT (ZOFRAN-ODT) 4 MG disintegrating tablet, Place 1 tablet on the tongue Every 8 (Eight) Hours As Needed for Nausea or Vomiting., Disp: 10 tablet, Rfl: 0    Prenatal Vit-Fe Fumarate-FA (prenatal vitamin 27-0.8) 27-0.8 MG tablet tablet,  "Take 1 tablet by mouth Daily., Disp: , Rfl:     tamsulosin (FLOMAX) 0.4 MG capsule 24 hr capsule, Take 1 capsule by mouth Daily., Disp: 4 capsule, Rfl: 0    traMADol (ULTRAM) 50 MG tablet, Take 1 tablet by mouth Every 6 (Six) Hours As Needed for Moderate Pain., Disp: 10 tablet, Rfl: 0    vitamin B-6 (PYRIDOXINE) 25 MG tablet, Take 1 tablet by mouth Daily., Disp: , Rfl:     tamsulosin (FLOMAX) 0.4 MG capsule 24 hr capsule, Take 1 capsule by mouth Every Night for 30 days., Disp: 30 capsule, Rfl: 0    Past Medical History:   Diagnosis Date    Urinary tract infection        History reviewed. No pertinent surgical history.    Social History     Socioeconomic History    Marital status:    Tobacco Use    Smoking status: Never     Passive exposure: Never    Smokeless tobacco: Never   Vaping Use    Vaping Use: Never used   Substance and Sexual Activity    Alcohol use: Not Currently     Comment: occasionally    Drug use: No    Sexual activity: Yes     Partners: Male     Birth control/protection: None, OCP       Family History   Problem Relation Age of Onset    Diabetes Maternal Great-Grandfather     Dementia Maternal Great-Grandfather        Objective    Temp 96.4 °F (35.8 °C)   Ht 167.6 cm (66\")   Wt 76.2 kg (168 lb)   LMP 11/01/2023 (Approximate)   BMI 27.12 kg/m²     Physical Exam  Vitals reviewed.   Constitutional:       General: She is not in acute distress.     Appearance: Normal appearance. She is not toxic-appearing.   HENT:      Head: Normocephalic and atraumatic.   Pulmonary:      Effort: Pulmonary effort is normal.   Skin:     Coloration: Skin is not pale.   Neurological:      Mental Status: She is alert.   Psychiatric:         Mood and Affect: Mood normal.         Behavior: Behavior normal.             Results for orders placed or performed in visit on 11/27/23   POC Urinalysis Dipstick, Multipro    Specimen: Urine   Result Value Ref Range    Color Yellow Yellow, Straw, Dark Yellow, Libby    Clarity, UA " Clear Clear    Glucose, UA Negative Negative mg/dL    Bilirubin Small (1+) (A) Negative    Ketones, UA Trace (A) Negative    Specific Gravity  1.030 1.005 - 1.030    Blood, UA Large (A) Negative    pH, Urine 5.5 5.0 - 8.0    Protein,  mg/dL (A) Negative mg/dL    Urobilinogen, UA 0.2 E.U./dL Normal, 0.2 E.U./dL    Nitrite, UA Negative Negative    Leukocytes Negative Negative     KUB independent review    A KUB is available for me to review today.  The image is inspected for a bowel gas pattern and the general bone structure of the spine and pelvis. The kidneys are then inspected closely.  Renal outline is noted if identifiable. The kidney, collecting system, and anticipated path of the ureter are examined for calcifications including those in the true pelvis.  This film reveals:    On the right there is a single distal ureteral stone measuring 3-4 mm.    On the left there are no calcificaitons seen in the kidney or the expected course of the ureter. .  Assessment and Plan    Diagnoses and all orders for this visit:    1. Acute right-sided low back pain without sciatica (Primary)  -     POC Urinalysis Dipstick, Multipro  -     XR Abdomen KUB  -     tamsulosin (FLOMAX) 0.4 MG capsule 24 hr capsule; Take 1 capsule by mouth Every Night for 30 days.  Dispense: 30 capsule; Refill: 0    2. Right ureteral stone  -     tamsulosin (FLOMAX) 0.4 MG capsule 24 hr capsule; Take 1 capsule by mouth Every Night for 30 days.  Dispense: 30 capsule; Refill: 0    Patient went to the emergency department 11/20/2023 CT scan showed approximate 4 mm stone in the right distal ureter few centimeters above the UVJ.  On the KUB the stone appears to have moved more distally it is visible on the right.  We discussed treatment options to include try to pass the stone, ureteroscopy, and ESWL.  Given the distal area of the stone plus size of the stone I do not think ESWL is a good option.  If he were to perform surgery would be with  ureteroscopy which would require stent which I told her some patients can have significant stent discomfort.  Given the size and fact that her symptoms are not severe at this time it would be reasonable to try to pass the stone.  She does have tamsulosin which I refilled today she needs to force fluids strain urine she was given a strainer today.  Also I will send a message to Dr. Isaca to send her pain medication to her pharmacy.  Patient passed the stone she will bring it in sooner and we can send for analysis.  No other stones seen in either kidney or ureter.  I also discussed if she should have any worsening pain is not controlled with tramadol or intractable nausea vomiting, development of fever and chills or she has decreased or no urine output she needs to either come the emergency department or come back to the clinic.

## 2023-11-22 ENCOUNTER — TELEPHONE (OUTPATIENT)
Dept: UROLOGY | Facility: CLINIC | Age: 23
End: 2023-11-22
Payer: COMMERCIAL

## 2023-11-27 ENCOUNTER — HOSPITAL ENCOUNTER (OUTPATIENT)
Dept: GENERAL RADIOLOGY | Facility: HOSPITAL | Age: 23
Discharge: HOME | End: 2023-11-27
Admitting: PHYSICIAN ASSISTANT
Payer: COMMERCIAL

## 2023-11-27 ENCOUNTER — OFFICE VISIT (OUTPATIENT)
Dept: UROLOGY | Facility: CLINIC | Age: 23
End: 2023-11-27
Payer: COMMERCIAL

## 2023-11-27 VITALS — BODY MASS INDEX: 27 KG/M2 | TEMPERATURE: 96.4 F | HEIGHT: 66 IN | WEIGHT: 168 LBS

## 2023-11-27 DIAGNOSIS — N20.1 RIGHT URETERAL STONE: ICD-10-CM

## 2023-11-27 DIAGNOSIS — M54.50 ACUTE RIGHT-SIDED LOW BACK PAIN WITHOUT SCIATICA: Primary | ICD-10-CM

## 2023-11-27 DIAGNOSIS — N20.1 RIGHT URETERAL STONE: Primary | ICD-10-CM

## 2023-11-27 LAB
BILIRUB BLD-MCNC: ABNORMAL MG/DL
CLARITY, POC: CLEAR
COLOR UR: YELLOW
GLUCOSE UR STRIP-MCNC: NEGATIVE MG/DL
KETONES UR QL: ABNORMAL
LEUKOCYTE EST, POC: NEGATIVE
NITRITE UR-MCNC: NEGATIVE MG/ML
PH UR: 5.5 [PH] (ref 5–8)
PROT UR STRIP-MCNC: ABNORMAL MG/DL
RBC # UR STRIP: ABNORMAL /UL
SP GR UR: 1.03 (ref 1–1.03)
UROBILINOGEN UR QL: ABNORMAL

## 2023-11-27 PROCEDURE — 74018 RADEX ABDOMEN 1 VIEW: CPT

## 2023-11-27 PROCEDURE — 99204 OFFICE O/P NEW MOD 45 MIN: CPT | Performed by: PHYSICIAN ASSISTANT

## 2023-11-27 PROCEDURE — 1159F MED LIST DOCD IN RCRD: CPT | Performed by: PHYSICIAN ASSISTANT

## 2023-11-27 PROCEDURE — 1160F RVW MEDS BY RX/DR IN RCRD: CPT | Performed by: PHYSICIAN ASSISTANT

## 2023-11-27 RX ORDER — TRAMADOL HYDROCHLORIDE 50 MG/1
50 TABLET ORAL EVERY 6 HOURS PRN
Qty: 15 TABLET | Refills: 0 | Status: SHIPPED | OUTPATIENT
Start: 2023-11-27

## 2023-11-27 RX ORDER — TAMSULOSIN HYDROCHLORIDE 0.4 MG/1
1 CAPSULE ORAL NIGHTLY
Qty: 30 CAPSULE | Refills: 0 | Status: SHIPPED | OUTPATIENT
Start: 2023-11-27 | End: 2023-12-27

## 2023-11-29 NOTE — PROGRESS NOTES
Subjective    Ms. Fernandes is 23 y.o. female    Chief Complaint: Right Ureteral stone    History of Present Illness  Patient presents for 1 week follow-up with history of 4 mm right distal ureteral stone.  She is having moderate intermittent pain.  Few days ago she had an episode of intense severe pain and then the pain resolved abruptly and since then she has had no symptoms she is not aware of passing a stone but she stated she did not strain every time.  KUB was done today that calcification seen in the right hemipelvis seen on previous KUB is no longer seen consistent with stone passage.    The following portions of the patient's history were reviewed and updated as appropriate: allergies, current medications, past family history, past medical history, past social history, past surgical history and problem list.    Review of Systems   Constitutional:  Negative for chills and fever.   Gastrointestinal:  Negative for abdominal pain, anal bleeding and blood in stool.   Genitourinary:  Negative for difficulty urinating, dysuria, flank pain, frequency, hematuria and urgency.         Current Outpatient Medications:     Blisovi FE 1/20 1-20 MG-MCG per tablet, Take 1 tablet by mouth Daily., Disp: , Rfl:     busPIRone (BUSPAR) 7.5 MG tablet, Take 1 tablet by mouth 3 times a day., Disp: , Rfl:     ibuprofen (ADVIL,MOTRIN) 600 MG tablet, Take 1 tablet by mouth Every 6 (Six) Hours As Needed for Mild Pain (First Line: Mild pain.)., Disp: 60 tablet, Rfl: 2    ondansetron ODT (ZOFRAN-ODT) 4 MG disintegrating tablet, Place 1 tablet on the tongue Every 8 (Eight) Hours As Needed for Nausea or Vomiting., Disp: 10 tablet, Rfl: 0    vitamin B-6 (PYRIDOXINE) 25 MG tablet, Take 1 tablet by mouth Daily., Disp: , Rfl:     FeroSul 325 (65 Fe) MG tablet, Take 1 tablet by mouth 3 (Three) Times a Day. (Patient not taking: Reported on 12/4/2023), Disp: , Rfl:     Prenatal Vit-Fe Fumarate-FA (prenatal vitamin 27-0.8) 27-0.8 MG tablet tablet,  "Take 1 tablet by mouth Daily. (Patient not taking: Reported on 12/4/2023), Disp: , Rfl:     tamsulosin (FLOMAX) 0.4 MG capsule 24 hr capsule, Take 1 capsule by mouth Daily. (Patient not taking: Reported on 12/4/2023), Disp: 4 capsule, Rfl: 0    tamsulosin (FLOMAX) 0.4 MG capsule 24 hr capsule, Take 1 capsule by mouth Every Night for 30 days. (Patient not taking: Reported on 12/4/2023), Disp: 30 capsule, Rfl: 0    traMADol (ULTRAM) 50 MG tablet, Take 1 tablet by mouth Every 6 (Six) Hours As Needed for Severe Pain. (Patient not taking: Reported on 12/4/2023), Disp: 15 tablet, Rfl: 0    Past Medical History:   Diagnosis Date    Urinary tract infection        History reviewed. No pertinent surgical history.    Social History     Socioeconomic History    Marital status:    Tobacco Use    Smoking status: Never     Passive exposure: Never    Smokeless tobacco: Never   Vaping Use    Vaping Use: Never used   Substance and Sexual Activity    Alcohol use: Not Currently     Comment: occasionally    Drug use: No    Sexual activity: Yes     Partners: Male     Birth control/protection: None, OCP       Family History   Problem Relation Age of Onset    Diabetes Maternal Great-Grandfather     Dementia Maternal Great-Grandfather        Objective    Temp 97.1 °F (36.2 °C)   Ht 167.6 cm (66\")   Wt 75.8 kg (167 lb)   LMP 11/01/2023 (Approximate)   BMI 26.95 kg/m²     Physical Exam  Vitals reviewed.   Constitutional:       General: She is not in acute distress.     Appearance: Normal appearance. She is not toxic-appearing.   HENT:      Head: Normocephalic and atraumatic.   Pulmonary:      Effort: Pulmonary effort is normal.   Skin:     Coloration: Skin is not pale.   Neurological:      Mental Status: She is alert.   Psychiatric:         Mood and Affect: Mood normal.         Behavior: Behavior normal.             Results for orders placed or performed in visit on 12/04/23   POC Urinalysis Dipstick, Multipro    Specimen: Urine "   Result Value Ref Range    Color Yellow Yellow, Straw, Dark Yellow, Libby    Clarity, UA Clear Clear    Glucose, UA Negative Negative mg/dL    Bilirubin Negative Negative    Ketones, UA Negative Negative    Specific Gravity  1.025 1.005 - 1.030    Blood, UA Trace (A) Negative    pH, Urine 6.0 5.0 - 8.0    Protein, POC Negative Negative mg/dL    Urobilinogen, UA 0.2 E.U./dL Normal, 0.2 E.U./dL    Nitrite, UA Negative Negative    Leukocytes Trace (A) Negative   KUB independent review    A KUB is available for me to review today.  The image is inspected for a bowel gas pattern and the general bone structure of the spine and pelvis. The kidneys are then inspected closely.  Renal outline is noted if identifiable. The kidney, collecting system, and anticipated path of the ureter are examined for calcifications including those in the true pelvis.  This film reveals:    On the right there are no calcificaitons seen in the kidney or the expected course of the ureter. .    On the left there are no calcificaitons seen in the kidney or the expected course of the ureter. .  Assessment and Plan    Diagnoses and all orders for this visit:    1. Right ureteral stone (Primary)  -     POC Urinalysis Dipstick, Multipro  -     XR Abdomen KUB  I no longer see the calcification in the right hemipelvis on the KUB consistent with stone passage plus patient is no longer symptomatic she had a episode of intense pain which resolved abruptly and since then she is not had any symptoms.  However she did not actually visualize a stone passing she stated there were times she did not strain so it could have passed during this.  She will return in 3 months with KUB prior I also gave her a stone dietary sheet for stone prevention.  If she should have return of pain I would have her get a CT scan.

## 2023-12-01 ENCOUNTER — TELEPHONE (OUTPATIENT)
Dept: UROLOGY | Facility: CLINIC | Age: 23
End: 2023-12-01
Payer: COMMERCIAL

## 2023-12-01 NOTE — TELEPHONE ENCOUNTER
Called Patient to remind them to get KUB x-ray prior to appointment.  Spoke with Patient.  If patient calls back it is ok for the HUB to tell the pt the message.

## 2023-12-04 ENCOUNTER — HOSPITAL ENCOUNTER (OUTPATIENT)
Dept: GENERAL RADIOLOGY | Facility: HOSPITAL | Age: 23
Discharge: HOME | End: 2023-12-04
Admitting: PHYSICIAN ASSISTANT
Payer: COMMERCIAL

## 2023-12-04 ENCOUNTER — OFFICE VISIT (OUTPATIENT)
Dept: UROLOGY | Facility: CLINIC | Age: 23
End: 2023-12-04
Payer: COMMERCIAL

## 2023-12-04 VITALS — BODY MASS INDEX: 26.84 KG/M2 | HEIGHT: 66 IN | WEIGHT: 167 LBS | TEMPERATURE: 97.1 F

## 2023-12-04 DIAGNOSIS — N20.1 RIGHT URETERAL STONE: Primary | ICD-10-CM

## 2023-12-04 LAB
BILIRUB BLD-MCNC: NEGATIVE MG/DL
CLARITY, POC: CLEAR
COLOR UR: YELLOW
GLUCOSE UR STRIP-MCNC: NEGATIVE MG/DL
KETONES UR QL: NEGATIVE
LEUKOCYTE EST, POC: ABNORMAL
NITRITE UR-MCNC: NEGATIVE MG/ML
PH UR: 6 [PH] (ref 5–8)
PROT UR STRIP-MCNC: NEGATIVE MG/DL
RBC # UR STRIP: ABNORMAL /UL
SP GR UR: 1.02 (ref 1–1.03)
UROBILINOGEN UR QL: ABNORMAL

## 2023-12-04 PROCEDURE — 1160F RVW MEDS BY RX/DR IN RCRD: CPT | Performed by: PHYSICIAN ASSISTANT

## 2023-12-04 PROCEDURE — 1159F MED LIST DOCD IN RCRD: CPT | Performed by: PHYSICIAN ASSISTANT

## 2023-12-04 PROCEDURE — 74018 RADEX ABDOMEN 1 VIEW: CPT

## 2023-12-04 PROCEDURE — 99214 OFFICE O/P EST MOD 30 MIN: CPT | Performed by: PHYSICIAN ASSISTANT

## 2023-12-25 DIAGNOSIS — N20.1 RIGHT URETERAL STONE: ICD-10-CM

## 2023-12-25 DIAGNOSIS — M54.50 ACUTE RIGHT-SIDED LOW BACK PAIN WITHOUT SCIATICA: ICD-10-CM

## 2023-12-26 RX ORDER — TAMSULOSIN HYDROCHLORIDE 0.4 MG/1
1 CAPSULE ORAL NIGHTLY
Qty: 90 CAPSULE | Refills: 0 | Status: SHIPPED | OUTPATIENT
Start: 2023-12-26

## 2024-02-20 NOTE — PROGRESS NOTES
Subjective    Ms. Fernandes is 24 y.o. female    Chief Complaint: Right Ureteral stone    History of Present Illness  Patient is a 24-year-old female who presents for 3-month follow-up she had a 4 mm stone which was in the right distal ureter previously.  She was having moderate intermittent pain her pain abruptly resolved and symptoms have not returned.  Not aware of passing a stone her KUB that was done last visit revealed no calcifications previously there was a calcification in the right hemipelvis that is no longer seen.  Had a KUB today which was also negative for any obvious stones she remains asymptomatic.  Urine is clear except for large blood but she is currently on her menses.      The following portions of the patient's history were reviewed and updated as appropriate: allergies, current medications, past family history, past medical history, past social history, past surgical history and problem list.    Review of Systems   Genitourinary: Negative.          Current Outpatient Medications:     Blisovi FE 1/20 1-20 MG-MCG per tablet, Take 1 tablet by mouth Daily., Disp: , Rfl:     busPIRone (BUSPAR) 7.5 MG tablet, Take 1 tablet by mouth 3 times a day., Disp: , Rfl:     ibuprofen (ADVIL,MOTRIN) 600 MG tablet, Take 1 tablet by mouth Every 6 (Six) Hours As Needed for Mild Pain (First Line: Mild pain.)., Disp: 60 tablet, Rfl: 2    ondansetron ODT (ZOFRAN-ODT) 4 MG disintegrating tablet, Place 1 tablet on the tongue Every 8 (Eight) Hours As Needed for Nausea or Vomiting., Disp: 10 tablet, Rfl: 0    tamsulosin (FLOMAX) 0.4 MG capsule 24 hr capsule, TAKE 1 CAPSULE BY MOUTH EVERY NIGHT, Disp: 90 capsule, Rfl: 0    vitamin B-6 (PYRIDOXINE) 25 MG tablet, Take 1 tablet by mouth Daily., Disp: , Rfl:     FeroSul 325 (65 Fe) MG tablet, Take 1 tablet by mouth 3 (Three) Times a Day. (Patient not taking: Reported on 12/4/2023), Disp: , Rfl:     Prenatal Vit-Fe Fumarate-FA (prenatal vitamin 27-0.8) 27-0.8 MG tablet tablet,  "Take 1 tablet by mouth Daily. (Patient not taking: Reported on 12/4/2023), Disp: , Rfl:     tamsulosin (FLOMAX) 0.4 MG capsule 24 hr capsule, Take 1 capsule by mouth Daily. (Patient not taking: Reported on 12/4/2023), Disp: 4 capsule, Rfl: 0    traMADol (ULTRAM) 50 MG tablet, Take 1 tablet by mouth Every 6 (Six) Hours As Needed for Severe Pain. (Patient not taking: Reported on 12/4/2023), Disp: 15 tablet, Rfl: 0    Past Medical History:   Diagnosis Date    Urinary tract infection        History reviewed. No pertinent surgical history.    Social History     Socioeconomic History    Marital status:    Tobacco Use    Smoking status: Never     Passive exposure: Never    Smokeless tobacco: Never   Vaping Use    Vaping status: Never Used   Substance and Sexual Activity    Alcohol use: Not Currently     Comment: occasionally    Drug use: No    Sexual activity: Yes     Partners: Male     Birth control/protection: None, OCP       Family History   Problem Relation Age of Onset    Diabetes Maternal Great-Grandfather     Dementia Maternal Great-Grandfather        Objective    Temp 97.1 °F (36.2 °C)   Ht 167.6 cm (66\")   Wt 74.4 kg (164 lb)   BMI 26.47 kg/m²     Physical Exam  Vitals reviewed.   Constitutional:       Appearance: Normal appearance.   HENT:      Head: Normocephalic and atraumatic.   Pulmonary:      Effort: Pulmonary effort is normal.   Skin:     Coloration: Skin is not pale.   Neurological:      Mental Status: She is alert.   Psychiatric:         Mood and Affect: Mood normal.         Behavior: Behavior normal.             Results for orders placed or performed in visit on 03/04/24   POC Urinalysis Dipstick, Multipro    Specimen: Urine   Result Value Ref Range    Color Yellow Yellow, Straw, Dark Yellow, Libby    Clarity, UA Clear Clear    Glucose, UA Negative Negative mg/dL    Bilirubin Negative Negative    Ketones, UA Negative Negative    Specific Gravity  1.030 1.005 - 1.030    Blood, UA Large (A) " Negative    pH, Urine 6.0 5.0 - 8.0    Protein, POC Negative Negative mg/dL    Urobilinogen, UA 0.2 E.U./dL Normal, 0.2 E.U./dL    Nitrite, UA Negative Negative    Leukocytes Negative Negative     KUB independent review    A KUB is available for me to review today.  The image is inspected for a bowel gas pattern and the general bone structure of the spine and pelvis. The kidneys are then inspected closely.  Renal outline is noted if identifiable. The kidney, collecting system, and anticipated path of the ureter are examined for calcifications including those in the true pelvis.  This film reveals:    On the right there are no calcificaitons seen in the kidney or the expected course of the ureter.     On the left there are no calcificaitons seen in the kidney or the expected course of the ureter.   Assessment and Plan    Diagnoses and all orders for this visit:    1. Right ureteral stone (Primary)  -     POC Urinalysis Dipstick, Multipro  -     XR Abdomen KUB; Future  Patient has had no further stone episodes the 4 mm right distal ureteral stone must of passed she has not been asymptomatic since last seen her urine is positive for blood due to her menses otherwise negative.  AP today shows no obvious calcifications or indicate kidney stones.    She will return in 6 months KUB prior.

## 2024-03-01 ENCOUNTER — TELEPHONE (OUTPATIENT)
Dept: UROLOGY | Facility: CLINIC | Age: 24
End: 2024-03-01
Payer: COMMERCIAL

## 2024-03-04 ENCOUNTER — OFFICE VISIT (OUTPATIENT)
Dept: UROLOGY | Facility: CLINIC | Age: 24
End: 2024-03-04
Payer: COMMERCIAL

## 2024-03-04 ENCOUNTER — HOSPITAL ENCOUNTER (OUTPATIENT)
Dept: GENERAL RADIOLOGY | Facility: HOSPITAL | Age: 24
Discharge: HOME | End: 2024-03-04
Admitting: PHYSICIAN ASSISTANT
Payer: COMMERCIAL

## 2024-03-04 VITALS — WEIGHT: 164 LBS | HEIGHT: 66 IN | BODY MASS INDEX: 26.36 KG/M2 | TEMPERATURE: 97.1 F

## 2024-03-04 DIAGNOSIS — N20.1 RIGHT URETERAL STONE: ICD-10-CM

## 2024-03-04 DIAGNOSIS — N20.1 RIGHT URETERAL STONE: Primary | ICD-10-CM

## 2024-03-04 LAB
BILIRUB BLD-MCNC: NEGATIVE MG/DL
CLARITY, POC: CLEAR
COLOR UR: YELLOW
GLUCOSE UR STRIP-MCNC: NEGATIVE MG/DL
KETONES UR QL: NEGATIVE
LEUKOCYTE EST, POC: NEGATIVE
NITRITE UR-MCNC: NEGATIVE MG/ML
PH UR: 6 [PH] (ref 5–8)
PROT UR STRIP-MCNC: NEGATIVE MG/DL
RBC # UR STRIP: ABNORMAL /UL
SP GR UR: 1.03 (ref 1–1.03)
UROBILINOGEN UR QL: ABNORMAL

## 2024-03-04 PROCEDURE — 74018 RADEX ABDOMEN 1 VIEW: CPT

## 2024-03-26 ENCOUNTER — OFFICE VISIT (OUTPATIENT)
Dept: FAMILY MEDICINE CLINIC | Facility: CLINIC | Age: 24
End: 2024-03-26
Payer: COMMERCIAL

## 2024-03-26 VITALS
SYSTOLIC BLOOD PRESSURE: 134 MMHG | DIASTOLIC BLOOD PRESSURE: 88 MMHG | RESPIRATION RATE: 18 BRPM | TEMPERATURE: 96.3 F | WEIGHT: 165 LBS | OXYGEN SATURATION: 97 % | HEIGHT: 66 IN | HEART RATE: 94 BPM | BODY MASS INDEX: 26.52 KG/M2

## 2024-03-26 DIAGNOSIS — J02.9 SORE THROAT: ICD-10-CM

## 2024-03-26 DIAGNOSIS — R05.9 COUGH IN ADULT: ICD-10-CM

## 2024-03-26 DIAGNOSIS — J01.00 ACUTE NON-RECURRENT MAXILLARY SINUSITIS: Primary | ICD-10-CM

## 2024-03-26 LAB
EXPIRATION DATE: NORMAL
EXPIRATION DATE: NORMAL
FLUAV AG UPPER RESP QL IA.RAPID: NOT DETECTED
FLUBV AG UPPER RESP QL IA.RAPID: NOT DETECTED
INTERNAL CONTROL: NORMAL
INTERNAL CONTROL: NORMAL
Lab: NORMAL
Lab: NORMAL
S PYO AG THROAT QL: NEGATIVE
SARS-COV-2 AG UPPER RESP QL IA.RAPID: NOT DETECTED

## 2024-03-26 PROCEDURE — 87428 SARSCOV & INF VIR A&B AG IA: CPT | Performed by: NURSE PRACTITIONER

## 2024-03-26 PROCEDURE — 99213 OFFICE O/P EST LOW 20 MIN: CPT | Performed by: NURSE PRACTITIONER

## 2024-03-26 RX ORDER — METHYLPREDNISOLONE 4 MG/1
TABLET ORAL
Qty: 21 TABLET | Refills: 0 | Status: SHIPPED | OUTPATIENT
Start: 2024-03-26

## 2024-03-26 RX ORDER — CEFDINIR 300 MG/1
300 CAPSULE ORAL 2 TIMES DAILY
Qty: 14 CAPSULE | Refills: 0 | Status: SHIPPED | OUTPATIENT
Start: 2024-03-26 | End: 2024-04-02

## 2024-03-26 NOTE — PATIENT INSTRUCTIONS
Medrol dose pack as directed  Cefdinir as directed, 1 by mouth twice a day for a week  Return to discuss allergies, sinus

## 2024-03-26 NOTE — PROGRESS NOTES
FERNANDO Aguero  [facility]   Family Medicine  [address]  [place] [address]  Phone: [phone]  Fax: [phone]         Chief Complaint:  Chief Complaint   Patient presents with    Sore Throat     Started last Tuesday     Cough        History:  Yuly Fernandes is a 24 y.o. female that is an established patient. She  is here for evaluation of the above complaint.    HPI     Onset of illness was Tuesday, throat itching and burning, laryngitis for 2-3 days, throat was on fire after that for several days. She is now , developing cough and sneezing, coughing up green. Vomited twice she thinks from drainage.    She developed illness after being exposed to cold air and cleaning a house that she describes as very dirty. She cleans house for a living.             ROS:  Review of Systems   Constitutional:  Positive for fatigue. Negative for fever.   HENT:  Positive for congestion, ear pain, postnasal drip, rhinorrhea, sinus pressure, sore throat and voice change.    Eyes: Negative.    Respiratory:  Positive for cough.    Cardiovascular: Negative.    Gastrointestinal:  Positive for vomiting.   Genitourinary: Negative.          reports that she has never smoked. She has never been exposed to tobacco smoke. She has never used smokeless tobacco. She reports that she does not currently use alcohol. She reports that she does not use drugs.    Current Outpatient Medications   Medication Instructions    Blisovi FE 1/20 1-20 MG-MCG per tablet 1 tablet, Oral, Daily    busPIRone (BUSPAR) 7.5 MG tablet 1 tablet, 3 times daily    cefdinir (OMNICEF) 300 mg, Oral, 2 Times Daily    FeroSul 325 (65 Fe) MG tablet 1 tablet, 3 Times Daily    ibuprofen (ADVIL,MOTRIN) 600 mg, Oral, Every 6 Hours PRN    methylPREDNISolone (MEDROL) 4 MG dose pack Take as directed on package instructions.    ondansetron ODT (ZOFRAN-ODT) 4 mg, Translingual, Every 8 Hours PRN    Prenatal Vit-Fe Fumarate-FA (prenatal  "vitamin 27-0.8) 27-0.8 MG tablet tablet 1 tablet, Daily    tamsulosin (FLOMAX) 0.4 mg, Oral, Daily    tamsulosin (FLOMAX) 0.4 mg, Oral, Nightly    traMADol (ULTRAM) 50 mg, Oral, Every 6 Hours PRN    vitamin B-6 (PYRIDOXINE) 25 mg, Oral, Daily       OBJECTIVE:  /88   Pulse 94   Temp 96.3 °F (35.7 °C) (Temporal)   Resp 18   Ht 167.6 cm (66\")   Wt 74.8 kg (165 lb)   SpO2 97%   BMI 26.63 kg/m²    Physical Exam  Vitals and nursing note reviewed.   Constitutional:       Appearance: Normal appearance.   HENT:      Head: Normocephalic and atraumatic.      Right Ear: Tympanic membrane, ear canal and external ear normal.      Left Ear: Tympanic membrane, ear canal and external ear normal.      Nose: Congestion and rhinorrhea present.      Mouth/Throat:      Pharynx: Oropharynx is clear. Posterior oropharyngeal erythema present. No oropharyngeal exudate.   Eyes:      Conjunctiva/sclera: Conjunctivae normal.   Cardiovascular:      Rate and Rhythm: Normal rate and regular rhythm.   Pulmonary:      Effort: Pulmonary effort is normal. No respiratory distress.      Breath sounds: Normal breath sounds. No wheezing or rales.   Neurological:      Mental Status: She is alert.         Procedures    Assessment/Plan:     Diagnoses and all orders for this visit:    1. Acute non-recurrent maxillary sinusitis (Primary)  -     cefdinir (OMNICEF) 300 MG capsule; Take 1 capsule by mouth 2 (Two) Times a Day for 7 days.  Dispense: 14 capsule; Refill: 0  -     methylPREDNISolone (MEDROL) 4 MG dose pack; Take as directed on package instructions.  Dispense: 21 tablet; Refill: 0    2. Sore throat  -     POCT rapid strep A    3. Cough in adult  -     POCT SARS-CoV-2 + Flu Antigen STEPHANIE      BMI is >= 25 and <30. (Overweight) The following options were offered after discussion;: exercise counseling/recommendations and nutrition counseling/recommendations       An After Visit Summary was printed and given to the patient at discharge.  Return " in about 4 weeks (around 4/23/2024) for Annual physical, [facility] Iesha, talk about allergies, then back with Lavern.       Patient Instructions   Medrol dose pack as directed  Cefdinir as directed, 1 by mouth twice a day for a week  Return to discuss allergies, sinus      Discussion:     She states she has had sinus issues for years, forgets to bring it up when she comes in. We will discuss this chronic issue at her next visit.    I spent 26 minutes caring for Yuly on this date of service. This time includes time spent by me in the following activities: preparing for the visit, reviewing tests, obtaining and/or reviewing a separately obtained history, performing a medically appropriate examination and/or evaluation, counseling and educating the patient/family/caregiver, ordering medications, tests, or procedures, documenting information in the medical record, and independently interpreting results and communicating that information with the patient/family/caregiver     Iesha KING 3/26/2024   Electronically signed.

## 2024-04-23 ENCOUNTER — OFFICE VISIT (OUTPATIENT)
Dept: FAMILY MEDICINE CLINIC | Facility: CLINIC | Age: 24
End: 2024-04-23
Payer: COMMERCIAL

## 2024-04-23 VITALS
RESPIRATION RATE: 16 BRPM | TEMPERATURE: 98.4 F | DIASTOLIC BLOOD PRESSURE: 72 MMHG | BODY MASS INDEX: 25.78 KG/M2 | HEIGHT: 66 IN | HEART RATE: 76 BPM | WEIGHT: 160.4 LBS | SYSTOLIC BLOOD PRESSURE: 118 MMHG | OXYGEN SATURATION: 98 %

## 2024-04-23 DIAGNOSIS — Z00.00 ANNUAL PHYSICAL EXAM: Primary | ICD-10-CM

## 2024-04-23 DIAGNOSIS — Z91.09 ENVIRONMENTAL ALLERGIES: ICD-10-CM

## 2024-04-23 DIAGNOSIS — R51.9 PERSISTENT HEADACHES: ICD-10-CM

## 2024-04-23 DIAGNOSIS — Z30.9 ENCOUNTER FOR CONTRACEPTIVE MANAGEMENT, UNSPECIFIED TYPE: ICD-10-CM

## 2024-04-23 PROCEDURE — 2014F MENTAL STATUS ASSESS: CPT | Performed by: NURSE PRACTITIONER

## 2024-04-23 PROCEDURE — 99395 PREV VISIT EST AGE 18-39: CPT | Performed by: NURSE PRACTITIONER

## 2024-04-23 PROCEDURE — 99213 OFFICE O/P EST LOW 20 MIN: CPT | Performed by: NURSE PRACTITIONER

## 2024-04-23 RX ORDER — FLUTICASONE PROPIONATE 50 MCG
1 SPRAY, SUSPENSION (ML) NASAL DAILY
Qty: 16 G | Refills: 5 | Status: SHIPPED | OUTPATIENT
Start: 2024-04-23

## 2024-04-23 RX ORDER — CETIRIZINE HYDROCHLORIDE 10 MG/1
10 TABLET ORAL DAILY
Qty: 30 TABLET | Refills: 5 | Status: SHIPPED | OUTPATIENT
Start: 2024-04-23

## 2024-04-23 RX ORDER — NORETHINDRONE ACETATE AND ETHINYL ESTRADIOL 1MG-20(21)
1 KIT ORAL DAILY
Qty: 28 TABLET | Refills: 0 | Status: SHIPPED | OUTPATIENT
Start: 2024-04-23

## 2024-04-23 NOTE — PROGRESS NOTES
FERNANDO Aguero  [facility]   Family Medicine  [address]  [place] [address]  Phone: [phone]  Fax: [phone]         Chief Complaint:  Chief Complaint   Patient presents with    Allergy Testing     Allergy recheck        History:  Yuly Fernandes is a 24 y.o. female that is an established patient. She  is here for evaluation of the above complaint.    HPI     She has had congestion, AR year round, worse in spring and fall, has treated sinusitis about 5 times/year. No pets in the home. She takes no allergy medication at this point. The sinus pressure does give her persistent headaches, no photophobia. No n/v. She notices the headache in the temple area and spreads over the entire head. She has them about 2-3 times/month.    She is also here for annual exam. She has pap scheduled at the end of the month, has regular dental and vision visits. She does need a RF of her OCP until she can see OB/GYN.           ROS:  Review of Systems   Constitutional: Negative.    HENT:  Positive for congestion, postnasal drip, rhinorrhea and sinus pressure.    Respiratory: Negative.  Negative for cough.    Cardiovascular: Negative.    Gastrointestinal: Negative.    Genitourinary: Negative.          reports that she has never smoked. She has never been exposed to tobacco smoke. She has never used smokeless tobacco. She reports that she does not currently use alcohol. She reports that she does not use drugs.    Current Outpatient Medications   Medication Instructions    Blisovi FE 1/20 1-20 MG-MCG per tablet 1 tablet, Oral, Daily    cetirizine (ZYRTEC) 10 mg, Oral, Daily    fluticasone (FLONASE) 50 MCG/ACT nasal spray 1 spray, Nasal, Daily    ibuprofen (ADVIL,MOTRIN) 600 mg, Oral, Every 6 Hours PRN    ubrogepant (UBRELVY) 100 mg, Oral, Once    vitamin B-6 (PYRIDOXINE) 25 mg, Oral, Daily       OBJECTIVE:  /72   Pulse 76   Temp 98.4 °F (36.9 °C) (Temporal)   Resp 16   Ht 167.6 cm  "(66\")   Wt 72.8 kg (160 lb 6.4 oz)   SpO2 98%   BMI 25.89 kg/m²    Physical Exam  Constitutional:       Appearance: Normal appearance.   HENT:      Head: Normocephalic and atraumatic.      Right Ear: Tympanic membrane, ear canal and external ear normal.      Left Ear: Tympanic membrane, ear canal and external ear normal.      Nose: Nose normal.      Mouth/Throat:      Mouth: Mucous membranes are moist.      Pharynx: Oropharynx is clear.   Eyes:      General: Allergic shiner present.      Extraocular Movements: Extraocular movements intact.      Conjunctiva/sclera: Conjunctivae normal.      Pupils: Pupils are equal, round, and reactive to light.   Cardiovascular:      Rate and Rhythm: Normal rate and regular rhythm.      Pulses: Normal pulses.      Heart sounds: Normal heart sounds.   Pulmonary:      Effort: Pulmonary effort is normal.      Breath sounds: Normal breath sounds.   Abdominal:      General: Bowel sounds are normal.      Palpations: Abdomen is soft.   Musculoskeletal:         General: Normal range of motion.      Cervical back: Normal range of motion and neck supple.   Skin:     General: Skin is warm and dry.      Capillary Refill: Capillary refill takes less than 2 seconds.   Neurological:      General: No focal deficit present.      Mental Status: She is alert and oriented to person, place, and time.   Psychiatric:         Mood and Affect: Mood normal.         Behavior: Behavior normal.         Thought Content: Thought content normal.         Procedures    Assessment/Plan:     Diagnoses and all orders for this visit:    1. Annual physical exam (Primary)  -Immunizations:      - Tetanus: up to date      - Influenza: up to date      - Prevnar: Once after age 65      - Shingrix: Series after 50      - COVID: up to date  CRC screening: Due at 45  Mammogram: Due at 50  PAP:  scheduled  DEXA: DEXA scan at 65     2. Encounter for contraceptive management, unspecified type  -     Blisovi FE 1/20 1-20 MG-MCG " per tablet; Take 1 tablet by mouth Daily.  Dispense: 28 tablet; Refill: 0    3. Environmental allergies  -     cetirizine (zyrTEC) 10 MG tablet; Take 1 tablet by mouth Daily.  Dispense: 30 tablet; Refill: 5  -     Ambulatory Referral to Allergy  -     fluticasone (FLONASE) 50 MCG/ACT nasal spray; 1 spray into the nostril(s) as directed by provider Daily.  Dispense: 16 g; Refill: 5    4. Persistent headaches  -     ubrogepant (Ubrelvy) 100 MG tablet; Take 1 tablet by mouth 1 (One) Time for 1 dose.  Dispense: 15 tablet; Refill: 2      BMI is >= 25 and <30. (Overweight) The following options were offered after discussion;: exercise counseling/recommendations and nutrition counseling/recommendations       An After Visit Summary was printed and given to the patient at discharge.  No follow-ups on file.       There are no Patient Instructions on file for this visit.      Discussion:     I spent 32 minutes caring for Yuly on this date of service. This time includes time spent by me in the following activities: preparing for the visit, reviewing tests, obtaining and/or reviewing a separately obtained history, performing a medically appropriate examination and/or evaluation, counseling and educating the patient/family/caregiver, ordering medications, tests, or procedures, documenting information in the medical record, and independently interpreting results and communicating that information with the patient/family/caregiver     Iesha KING 4/23/2024   Electronically signed.

## 2024-04-29 ENCOUNTER — TELEPHONE (OUTPATIENT)
Dept: OBSTETRICS AND GYNECOLOGY | Age: 24
End: 2024-04-29

## 2024-04-29 NOTE — TELEPHONE ENCOUNTER
Caller: Yuly Fernandes    Relationship to patient: Self    Best call back number: [phone]    Patient is needing:  PATIENT CALLED AND CANCELED SAME DAY APPT FOR TODAY.

## 2024-05-28 ENCOUNTER — TELEPHONE (OUTPATIENT)
Dept: FAMILY MEDICINE CLINIC | Facility: CLINIC | Age: 24
End: 2024-05-28
Payer: COMMERCIAL

## 2024-05-28 DIAGNOSIS — K04.7 DENTAL INFECTION: Primary | ICD-10-CM

## 2024-05-28 RX ORDER — AMOXICILLIN AND CLAVULANATE POTASSIUM 875; 125 MG/1; MG/1
1 TABLET, FILM COATED ORAL 2 TIMES DAILY
Qty: 20 TABLET | Refills: 0 | Status: SHIPPED | OUTPATIENT
Start: 2024-05-28 | End: 2024-06-07

## 2024-05-28 NOTE — TELEPHONE ENCOUNTER
Caller: Yuly Fernandes    Relationship: Self    Best call back number: [phone]     What medication are you requesting: ANTIBIOTIC     What are your current symptoms: INFECTION IN WISDOM TOOTH     How long have you been experiencing symptoms: 3 DAYS     Have you had these symptoms before:    [] Yes  [] No    Have you been treated for these symptoms before:   [] Yes  [] No    If a prescription is needed, what is your preferred pharmacy and phone number: [facility] #[id number] - [place], [place] - [address] [facility] [phone] [facility] [phone]      Additional notes: PATIENT HAS AN APPT ON 06.21.24 TO HAVE THEM REMOVED BUT THEY TOLD HER TO REQUEST AN ANTIBIOTIC FROM PCP TO CLEAR UP THE INFECTION

## 2024-08-15 ENCOUNTER — OFFICE VISIT (OUTPATIENT)
Dept: OBSTETRICS AND GYNECOLOGY | Age: 24
End: 2024-08-15
Payer: COMMERCIAL

## 2024-08-15 VITALS
WEIGHT: 156 LBS | BODY MASS INDEX: 25.07 KG/M2 | DIASTOLIC BLOOD PRESSURE: 84 MMHG | HEIGHT: 66 IN | SYSTOLIC BLOOD PRESSURE: 122 MMHG

## 2024-08-15 DIAGNOSIS — Z30.011 ENCOUNTER FOR INITIAL PRESCRIPTION OF CONTRACEPTIVE PILLS: ICD-10-CM

## 2024-08-15 DIAGNOSIS — N92.1 MENORRHAGIA WITH IRREGULAR CYCLE: Primary | ICD-10-CM

## 2024-08-15 DIAGNOSIS — N94.6 DYSMENORRHEA: ICD-10-CM

## 2024-08-15 PROCEDURE — 99214 OFFICE O/P EST MOD 30 MIN: CPT | Performed by: NURSE PRACTITIONER

## 2024-08-15 RX ORDER — NAPROXEN SODIUM 550 MG/1
550 TABLET ORAL 2 TIMES DAILY WITH MEALS
Qty: 60 TABLET | Refills: 11 | Status: SHIPPED | OUTPATIENT
Start: 2024-08-15 | End: 2025-08-15

## 2024-08-15 RX ORDER — NORGESTIMATE AND ETHINYL ESTRADIOL 0.25-0.035
1 KIT ORAL DAILY
Qty: 28 TABLET | Refills: 3 | Status: SHIPPED | OUTPATIENT
Start: 2024-08-15

## 2024-08-15 NOTE — PROGRESS NOTES
"Subjective     Yuly Fernandes is a 24 y.o. female    History of Present Illness  Patient comes in today as a new patient.  She was originally scheduled for yearly checkup but she is bleeding heavy today.  She has been having heavy cycles with her birth control pills.  She also has a lot of clotting and cramping.  Menstrual Problem  This is a recurrent problem. The current episode started more than 1 month ago. The problem occurs intermittently. The problem has been waxing and waning. Associated symptoms include vertigo. Pertinent negatives include no abdominal pain, anorexia, arthralgias, change in bowel habit, chest pain, chills, congestion, coughing, diaphoresis, fatigue, fever, headaches, joint swelling, myalgias, nausea, neck pain, numbness, rash, sore throat, swollen glands, urinary symptoms, visual change, vomiting or weakness. Nothing aggravates the symptoms. She has tried nothing for the symptoms.       /84   Ht 167.6 cm (65.98\")   Wt 70.8 kg (156 lb)   LMP 08/03/2024 (Exact Date)   BMI 25.19 kg/m²     Outpatient Encounter Medications as of 8/15/2024   Medication Sig Dispense Refill    cetirizine (zyrTEC) 10 MG tablet Take 1 tablet by mouth Daily. 30 tablet 5    fluticasone (FLONASE) 50 MCG/ACT nasal spray 1 spray into the nostril(s) as directed by provider Daily. 16 g 5    ibuprofen (ADVIL,MOTRIN) 600 MG tablet Take 1 tablet by mouth Every 6 (Six) Hours As Needed for Mild Pain (First Line: Mild pain.). 60 tablet 2    vitamin B-6 (PYRIDOXINE) 25 MG tablet Take 1 tablet by mouth Daily.      [DISCONTINUED] [place] FE 1/20 1-20 MG-MCG per tablet Take 1 tablet by mouth Daily. 28 tablet 0    naproxen sodium (Anaprox DS) 550 MG tablet Take 1 tablet by mouth 2 (Two) Times a Day With Meals. 60 tablet 11    norgestimate-ethinyl estradiol (Sprintec 28) 0.25-35 MG-MCG per tablet Take 1 tablet by mouth Daily. 28 tablet 3     No facility-administered encounter medications on file as of 8/15/2024. "       Past Medical History  Past Medical History:   Diagnosis Date    Urinary tract infection        Surgical History  Past Surgical History:   Procedure Laterality Date    WISDOM TOOTH EXTRACTION         Family History  Family History   Problem Relation Age of Onset    Diabetes Maternal Great-Grandfather     Dementia Maternal Great-Grandfather     Breast cancer Neg Hx     Ovarian cancer Neg Hx     Uterine cancer Neg Hx     Colon cancer Neg Hx     Melanoma Neg Hx        The following portions of the patient's history were reviewed and updated as appropriate: allergies, current medications, past family history, past medical history, past social history, past surgical history, and problem list.    Review of Systems   Constitutional:  Negative for chills, diaphoresis, fatigue and fever.   HENT:  Negative for congestion, sore throat and swollen glands.    Respiratory:  Negative for cough.    Cardiovascular:  Negative for chest pain.   Gastrointestinal:  Negative for abdominal pain, anorexia, change in bowel habit, nausea and vomiting.   Genitourinary:  Positive for menstrual problem.   Musculoskeletal:  Negative for arthralgias, joint swelling, myalgias and neck pain.   Skin:  Negative for rash.   Neurological:  Positive for dizziness and vertigo. Negative for weakness and numbness.       Objective   Physical Exam  Vitals and nursing note reviewed.   Constitutional:       Appearance: She is well-developed.   HENT:      Head: Normocephalic and atraumatic.   Eyes:      General:         Right eye: No discharge.         Left eye: No discharge.      Conjunctiva/sclera: Conjunctivae normal.   Neck:      Thyroid: No thyromegaly.   Cardiovascular:      Rate and Rhythm: Normal rate and regular rhythm.      Heart sounds: Normal heart sounds.   Pulmonary:      Effort: Pulmonary effort is normal.      Breath sounds: Normal breath sounds.   Musculoskeletal:         General: Normal range of motion.      Cervical back: Normal range of  motion and neck supple.   Skin:     General: Skin is warm and dry.   Neurological:      Mental Status: She is alert and oriented to person, place, and time.   Psychiatric:         Mood and Affect: Mood normal.         Behavior: Behavior normal.         Thought Content: Thought content normal.         Judgment: Judgment normal.         PHQ-9 Depression Screening  Little interest or pleasure in doing things? 0-->not at all   Feeling down, depressed, or hopeless? 0-->not at all   Trouble falling or staying asleep, or sleeping too much?     Feeling tired or having little energy?     Poor appetite or overeating?     Feeling bad about yourself - or that you are a failure or have let yourself or your family down?     Trouble concentrating on things, such as reading the newspaper or watching television?     Moving or speaking so slowly that other people could have noticed? Or the opposite - being so fidgety or restless that you have been moving around a lot more than usual?     Thoughts that you would be better off dead, or of hurting yourself in some way?     PHQ-9 Total Score 0   If you checked off any problems, how difficult have these problems made it for you to do your work, take care of things at home, or get along with other people?          Assessment & Plan   Diagnoses and all orders for this visit:    1. Menorrhagia with irregular cycle (Primary)  Comments:  Patient is having very heavy cycles with cramping and clotting.  She will return for pelvic ultrasound.  This is ordered today as she was having some dizziness.  She will RTO for ultrasound and yearly checkup.  Orders:  -     CBC & Differential    2. Dysmenorrhea  Comments:  Patient is given Anaprox for her menstrual cramps.  Orders:  -     naproxen sodium (Anaprox DS) 550 MG tablet; Take 1 tablet by mouth 2 (Two) Times a Day With Meals.  Dispense: 60 tablet; Refill: 11    3. Encounter for initial prescription of contraceptive pills  Comments:  Patient has  been on birth control pills and was still having heavy cycles.  She is increased to Sprintec.  She will RTO for ultrasound.  And yearly checkup.  Orders:  -     norgestimate-ethinyl estradiol (Sprintec 28) 0.25-35 MG-MCG per tablet; Take 1 tablet by mouth Daily.  Dispense: 28 tablet; Refill: 3                Ofelia Minor, FERNANDO  8/15/2024

## 2024-08-22 NOTE — PROGRESS NOTES
Subjective    Ms. Fernandes is 24 y.o. female    Chief Complaint: Right ureteral stone    History of Present Illness  Patient presents for month follow-up with KUB patient has a history of previous kidney stones.  She did have a 4 mm stone which is in the right distal ureter which she assumed was passed.  He has done well just this past few weeks she has had bilateral mid to lower back pain.  He cannot think of any obvious injury or strain.  KUB was done and shows no obvious calcifications.  Urine is clear with a pH of 7.0.      The following portions of the patient's history were reviewed and updated as appropriate: allergies, current medications, past family history, past medical history, past social history, past surgical history and problem list.    Review of Systems      Current Outpatient Medications:     cetirizine (zyrTEC) 10 MG tablet, Take 1 tablet by mouth Daily., Disp: 30 tablet, Rfl: 5    fluticasone (FLONASE) 50 MCG/ACT nasal spray, 1 spray into the nostril(s) as directed by provider Daily., Disp: 16 g, Rfl: 5    ibuprofen (ADVIL,MOTRIN) 600 MG tablet, Take 1 tablet by mouth Every 6 (Six) Hours As Needed for Mild Pain (First Line: Mild pain.)., Disp: 60 tablet, Rfl: 2    naproxen sodium (Anaprox DS) 550 MG tablet, Take 1 tablet by mouth 2 (Two) Times a Day With Meals., Disp: 60 tablet, Rfl: 11    norgestimate-ethinyl estradiol (Sprintec 28) 0.25-35 MG-MCG per tablet, Take 1 tablet by mouth Daily., Disp: 28 tablet, Rfl: 3    vitamin B-6 (PYRIDOXINE) 25 MG tablet, Take 1 tablet by mouth Daily., Disp: , Rfl:     Past Medical History:   Diagnosis Date    Urinary tract infection        Past Surgical History:   Procedure Laterality Date    WISDOM TOOTH EXTRACTION         Social History     Socioeconomic History    Marital status:    Tobacco Use    Smoking status: Never     Passive exposure: Never    Smokeless tobacco: Never   Vaping Use    Vaping status: Never Used   Substance and Sexual Activity     Alcohol use: Yes     Comment: occasionally    Drug use: No    Sexual activity: Yes     Partners: Male     Birth control/protection: OCP       Family History   Problem Relation Age of Onset    Diabetes Maternal Great-Grandfather     Dementia Maternal Great-Grandfather     Breast cancer Neg Hx     Ovarian cancer Neg Hx     Uterine cancer Neg Hx     Colon cancer Neg Hx     Melanoma Neg Hx        Objective    Temp 97.8 °F (36.6 °C)   Wt 70.8 kg (156 lb)   LMP 08/03/2024 (Exact Date)   BMI 25.19 kg/m²     Physical Exam  Vitals reviewed.   Constitutional:       Appearance: Normal appearance.   HENT:      Head: Normocephalic and atraumatic.   Pulmonary:      Effort: Pulmonary effort is normal.   Skin:     Coloration: Skin is not pale.   Neurological:      Mental Status: She is alert.   Psychiatric:         Mood and Affect: Mood normal.         Behavior: Behavior normal.             Results for orders placed or performed in visit on 09/06/24   POC Urinalysis Dipstick, Multipro    Specimen: Urine   Result Value Ref Range    Color Yellow Yellow, Straw, Dark Yellow, Libby    Clarity, UA Clear Clear    Glucose, UA Negative Negative mg/dL    Bilirubin Negative Negative    Ketones, UA Negative Negative    Specific Gravity  1.025 1.005 - 1.030    Blood, UA Negative Negative    pH, Urine 7.0 5.0 - 8.0    Protein, POC Negative Negative mg/dL    Urobilinogen, UA 0.2 E.U./dL Normal, 0.2 E.U./dL    Nitrite, UA Negative Negative    Leukocytes Negative Negative     KUB independent review    A KUB is available for me to review today.  The image is inspected for a bowel gas pattern and the general bone structure of the spine and pelvis. The kidneys are then inspected closely.  Renal outline is noted if identifiable. The kidney, collecting system, and anticipated path of the ureter are examined for calcifications including those in the true pelvis.  This film reveals:    On the right there are no calcificaitons seen in the kidney or the  expected course of the ureter. .    On the left there are no calcificaitons seen in the kidney or the expected course of the ureter. .  Assessment and Plan    Diagnoses and all orders for this visit:    1. History of kidney stones (Primary)  -     POC Urinalysis Dipstick, Multipro  -     XR Abdomen KUB; Future    2. Acute bilateral low back pain without sciatica      I reviewed the KUB in detail and I was not able to identify any calcifications in the kidneys or course of the ureters.  If there is a stone is too small to be seen on KUB.    The low back pain she is identified could be stone related but would be unusual due to the fact that is occurring on both sides.  However if there is continued concern patient will contact us and I can order a CT renal stone protocol otherwise she will follow-up 6 months KUB prior.

## 2024-09-05 ENCOUNTER — TELEPHONE (OUTPATIENT)
Dept: UROLOGY | Facility: CLINIC | Age: 24
End: 2024-09-05
Payer: COMMERCIAL

## 2024-09-05 NOTE — TELEPHONE ENCOUNTER
Called Patient to remind them to get KUB 1 hr prior to appointment.  Spoke with Patient.  If patient calls back it is ok for the HUB to tell the pt the message.

## 2024-09-06 ENCOUNTER — HOSPITAL ENCOUNTER (OUTPATIENT)
Dept: GENERAL RADIOLOGY | Facility: HOSPITAL | Age: 24
Discharge: HOME | End: 2024-09-06
Admitting: PHYSICIAN ASSISTANT
Payer: COMMERCIAL

## 2024-09-06 ENCOUNTER — OFFICE VISIT (OUTPATIENT)
Dept: UROLOGY | Facility: CLINIC | Age: 24
End: 2024-09-06
Payer: COMMERCIAL

## 2024-09-06 VITALS — BODY MASS INDEX: 25.19 KG/M2 | WEIGHT: 156 LBS | TEMPERATURE: 97.8 F

## 2024-09-06 DIAGNOSIS — M54.50 ACUTE BILATERAL LOW BACK PAIN WITHOUT SCIATICA: ICD-10-CM

## 2024-09-06 DIAGNOSIS — N20.1 RIGHT URETERAL STONE: ICD-10-CM

## 2024-09-06 DIAGNOSIS — Z87.442 HISTORY OF KIDNEY STONES: Primary | ICD-10-CM

## 2024-09-06 LAB
BILIRUB BLD-MCNC: NEGATIVE MG/DL
CLARITY, POC: CLEAR
COLOR UR: YELLOW
GLUCOSE UR STRIP-MCNC: NEGATIVE MG/DL
KETONES UR QL: NEGATIVE
LEUKOCYTE EST, POC: NEGATIVE
NITRITE UR-MCNC: NEGATIVE MG/ML
PH UR: 7 [PH] (ref 5–8)
PROT UR STRIP-MCNC: NEGATIVE MG/DL
RBC # UR STRIP: NEGATIVE /UL
SP GR UR: 1.02 (ref 1–1.03)
UROBILINOGEN UR QL: NORMAL

## 2024-09-06 PROCEDURE — 74018 RADEX ABDOMEN 1 VIEW: CPT

## 2024-12-20 ENCOUNTER — OFFICE VISIT (OUTPATIENT)
Dept: FAMILY MEDICINE CLINIC | Facility: CLINIC | Age: 24
End: 2024-12-20
Payer: COMMERCIAL

## 2024-12-20 VITALS
BODY MASS INDEX: 23.95 KG/M2 | HEART RATE: 80 BPM | HEIGHT: 66 IN | SYSTOLIC BLOOD PRESSURE: 112 MMHG | WEIGHT: 149 LBS | OXYGEN SATURATION: 97 % | TEMPERATURE: 97.2 F | DIASTOLIC BLOOD PRESSURE: 68 MMHG

## 2024-12-20 DIAGNOSIS — J06.9 UPPER RESPIRATORY INFECTION WITH COUGH AND CONGESTION: Primary | ICD-10-CM

## 2024-12-20 DIAGNOSIS — R52 BODY ACHES: ICD-10-CM

## 2024-12-20 LAB
EXPIRATION DATE: NORMAL
FLUAV AG UPPER RESP QL IA.RAPID: NOT DETECTED
FLUBV AG UPPER RESP QL IA.RAPID: NOT DETECTED
INTERNAL CONTROL: NORMAL
Lab: NORMAL
SARS-COV-2 AG UPPER RESP QL IA.RAPID: NOT DETECTED

## 2024-12-20 PROCEDURE — 99213 OFFICE O/P EST LOW 20 MIN: CPT | Performed by: NURSE PRACTITIONER

## 2024-12-20 PROCEDURE — 87428 SARSCOV & INF VIR A&B AG IA: CPT | Performed by: NURSE PRACTITIONER

## 2024-12-20 RX ORDER — AZITHROMYCIN 250 MG/1
TABLET, FILM COATED ORAL
Qty: 6 TABLET | Refills: 0 | Status: SHIPPED | OUTPATIENT
Start: 2024-12-20

## 2024-12-20 RX ORDER — DEXTROMETHORPHAN HYDROBROMIDE AND PROMETHAZINE HYDROCHLORIDE 15; 6.25 MG/5ML; MG/5ML
5 SYRUP ORAL 4 TIMES DAILY PRN
Qty: 180 ML | Refills: 0 | Status: SHIPPED | OUTPATIENT
Start: 2024-12-20

## 2024-12-20 RX ORDER — METHYLPREDNISOLONE 4 MG/1
TABLET ORAL
Qty: 21 TABLET | Refills: 0 | Status: SHIPPED | OUTPATIENT
Start: 2024-12-20

## 2024-12-20 NOTE — PROGRESS NOTES
FERNANDO Aguero  [facility]   Family Medicine  [address]  [place] [address]  Phone: [phone]  Fax: [phone]         Chief Complaint:  Chief Complaint   Patient presents with    Cough     Productive cough X 2 weeks. OTC med have not helped.        History:  Yuly Fernandes is a 24 y.o. female that is an established patient. She  is here for evaluation of the above complaint.    Cough  Associated symptoms include headaches, myalgias, postnasal drip and a sore throat. Pertinent negatives include no chest pain, chills, ear pain, fever, rash, rhinorrhea, shortness of breath or wheezing.      History of Present Illness  The patient presents for evaluation of a persistent cough.    The patient initially experienced symptoms suggestive of sinusitis, including sneezing and nasal drainage, approximately 2 weeks ago. These symptoms progressed to a severe cough accompanied by temporary voice loss. Although the voice has since returned, the cough remains intense and productive, persisting for the past 2 weeks. The cough is severe enough to disrupt sleep on certain nights. Reports no fever but does report mild jaw pain during coughing episodes, which the patient attributes to the frequency of the cough. Reports no current sore throat, chest pain, or back pain. Reports no known drug allergies and has no history of pneumonia. Over-the-counter medications such as Mucinex, cough drops, DayQuil, and NyQuil have been utilized without significant relief. Continues to use Zyrtec and Flonase.    ALLERGIES  The patient has no known allergies.    MEDICATIONS  Current: Mucinex, DayQuil, NyQuil, Zyrtec, Flonase      Results         ROS:  Review of Systems   Constitutional:  Negative for chills and fever.   HENT:  Positive for postnasal drip and sore throat. Negative for ear pain and rhinorrhea.    Respiratory:  Positive for cough. Negative for shortness of breath and wheezing.   "  Cardiovascular:  Negative for chest pain.   Musculoskeletal:  Positive for myalgias.   Skin:  Negative for rash.   Neurological:  Positive for headaches.         reports that she has never smoked. She has never been exposed to tobacco smoke. She has never used smokeless tobacco. She reports current alcohol use. She reports that she does not use drugs.    Current Outpatient Medications   Medication Instructions    azithromycin (Zithromax Z-Jatin) 250 MG tablet Take 2 tablets by mouth on day 1, then 1 tablet daily on days 2-5    cetirizine (ZYRTEC) 10 mg, Oral, Daily    fluticasone (FLONASE) 50 MCG/ACT nasal spray 1 spray, Nasal, Daily    ibuprofen (ADVIL,MOTRIN) 600 mg, Oral, Every 6 Hours PRN    methylPREDNISolone (MEDROL) 4 MG dose pack Take as directed on package instructions.    naproxen sodium (ANAPROX DS) 550 mg, Oral, 2 Times Daily With Meals    norgestimate-ethinyl estradiol (Sprintec 28) 0.25-35 MG-MCG per tablet 1 tablet, Oral, Daily    promethazine-dextromethorphan (PROMETHAZINE-DM) 6.25-15 MG/5ML syrup 5 mL, Oral, 4 Times Daily PRN    vitamin B-6 (PYRIDOXINE) 25 mg, Daily       OBJECTIVE:  /68   Pulse 80   Temp 97.2 °F (36.2 °C)   Ht 167.6 cm (65.98\")   Wt 67.6 kg (149 lb)   SpO2 97%   BMI 24.06 kg/m²    Physical Exam  Vitals and nursing note reviewed.   Constitutional:       Appearance: Normal appearance.   HENT:      Head: Normocephalic and atraumatic.      Right Ear: Tympanic membrane, ear canal and external ear normal. There is no impacted cerumen.      Left Ear: Tympanic membrane, ear canal and external ear normal. There is no impacted cerumen.      Nose: Congestion and rhinorrhea present.      Mouth/Throat:      Pharynx: No oropharyngeal exudate or posterior oropharyngeal erythema.   Eyes:      Conjunctiva/sclera: Conjunctivae normal.   Cardiovascular:      Rate and Rhythm: Normal rate and regular rhythm.   Pulmonary:      Effort: Pulmonary effort is normal. No respiratory distress.      " Breath sounds: Normal breath sounds. No stridor. No decreased breath sounds, wheezing, rhonchi or rales.   Neurological:      General: No focal deficit present.      Mental Status: She is alert and oriented to person, place, and time.   Psychiatric:         Mood and Affect: Mood normal.         Behavior: Behavior normal.       Physical Exam  Ears were examined. Throat was examined.  Lungs are clear.    Procedures    Assessment/Plan:     Diagnoses and all orders for this visit:    1. Upper respiratory infection with cough and congestion (Primary)  -     azithromycin (Zithromax Z-Jatin) 250 MG tablet; Take 2 tablets by mouth on day 1, then 1 tablet daily on days 2-5  Dispense: 6 tablet; Refill: 0  -     methylPREDNISolone (MEDROL) 4 MG dose pack; Take as directed on package instructions.  Dispense: 21 tablet; Refill: 0  -     promethazine-dextromethorphan (PROMETHAZINE-DM) 6.25-15 MG/5ML syrup; Take 5 mL by mouth 4 (Four) Times a Day As Needed for Cough (cough at night).  Dispense: 180 mL; Refill: 0    2. Body aches  -     POCT SARS-CoV-2 + Flu Antigen STEPHANIE      BMI is within normal parameters. No other follow-up for BMI required.     Assessment & Plan  1. Persistent cough.  The patient has been experiencing a persistent cough with mucus production for the past 2 weeks, initially accompanied by sneezing and drainage, likely due to sinus issues. The cough has been severe enough to disrupt sleep and has not improved with over-the-counter medications such as Mucinex, DayQuil, and NyQuil. There is no fever, chest pain, or back pain, and the patient's lungs sound clear. Given the duration of symptoms, a bacterial infection is suspected. A prescription for Zithromax (azithromycin) has been provided to address the potential bacterial cause. Additionally, an oral steroid has been prescribed to reduce inflammation associated with the cough. A swab test will be conducted to confirm the diagnosis. A cough suppressant has been  prescribed to be taken 4 times daily, primarily at night, with a warning that it may induce drowsiness and should not be used before driving. If there is any change in symptoms, such as the development of fever, chest pain, or back pain, a chest x-ray will be considered.    An After Visit Summary was printed and given to the patient at discharge.  No follow-ups on file.       There are no Patient Instructions on file for this visit.      Discussion:     I spent 25 minutes caring for Yuly on this date of service. This time includes time spent by me in the following activities: preparing for the visit, reviewing tests, performing a medically appropriate examination and/or evaluation, counseling and educating the patient/family/caregiver, documenting information in the medical record, independently interpreting results and communicating that information with the patient/family/caregiver, care coordination, ordering medications, ordering test(s), obtaining a separately obtained history, and reviewing a separately obtained history   Patient or patient representative verbalized consent for the use of Ambient Listening during the visit with  FERNANDO Aguero for chart documentation. 12/20/2024  13:07 STACY KING 12/20/2024   Electronically signed.  Answers submitted by the patient for this visit:  Primary Reason for Visit (Submitted on 12/16/2024)  What is the primary reason for your visit?: Cough

## 2025-01-31 DIAGNOSIS — Z30.011 ENCOUNTER FOR INITIAL PRESCRIPTION OF CONTRACEPTIVE PILLS: ICD-10-CM

## 2025-01-31 RX ORDER — NORGESTIMATE AND ETHINYL ESTRADIOL 0.25-0.035
1 KIT ORAL DAILY
Qty: 28 TABLET | Refills: 7 | Status: SHIPPED | OUTPATIENT
Start: 2025-01-31

## 2025-03-28 NOTE — PROGRESS NOTES
Subjective    Ms. Fernandes is 25 y.o. female    Chief Complaint: Kidney Stones    History of Present Illness  Previous history of kidney stones presents for 6-month follow-up with KUB prior.  Patient has had no stone episodes since last seen.  She has not seen any gross hematuria.  She is not having any voiding symptoms or complaints or any flank pain excetra.  Her urine is clear pH of her urine is 7.0.  Medications to help prevent stones.    Patient reports that she is not currently experiencing any symptoms of urinary incontinence.     The following portions of the patient's history were reviewed and updated as appropriate: allergies, current medications, past family history, past medical history, past social history, past surgical history and problem list.    Review of Systems   Genitourinary: Negative.          Current Outpatient Medications:     norgestimate-ethinyl estradiol (Sprintec 28) 0.25-35 MG-MCG per tablet, Take 1 tablet by mouth Daily., Disp: 28 tablet, Rfl: 7    Past Medical History:   Diagnosis Date    Allergic     Seasonal    Ectopic pregnancy     Migraine     Recurrent pregnancy loss, antepartum condition or complication     Urinary tract infection        Past Surgical History:   Procedure Laterality Date    WISDOM TOOTH EXTRACTION         Social History     Socioeconomic History    Marital status:    Tobacco Use    Smoking status: Never     Passive exposure: Never    Smokeless tobacco: Never   Vaping Use    Vaping status: Never Used   Substance and Sexual Activity    Alcohol use: Yes     Alcohol/week: 1.0 standard drink of alcohol     Types: 1 Glasses of wine per week     Comment: occasionally    Drug use: No    Sexual activity: Yes     Partners: Male     Birth control/protection: Birth control pill       Family History   Problem Relation Age of Onset    Diabetes Maternal Great-Grandfather     Dementia Maternal Great-Grandfather     Heart disease Maternal Grandmother     Hypertension  "Paternal Grandfather     Clotting disorder Paternal Grandmother     Breast cancer Neg Hx     Ovarian cancer Neg Hx     Uterine cancer Neg Hx     Colon cancer Neg Hx     Melanoma Neg Hx        Objective    Temp 97.4 °F (36.3 °C)   Ht 167.6 cm (66\")   Wt 67.7 kg (149 lb 3.2 oz)   BMI 24.08 kg/m²     Physical Exam  Vitals reviewed.   Constitutional:       Appearance: Normal appearance.   HENT:      Head: Normocephalic and atraumatic.   Pulmonary:      Effort: Pulmonary effort is normal.   Neurological:      Mental Status: She is alert.   Psychiatric:         Mood and Affect: Mood normal.         Behavior: Behavior normal.             Results for orders placed or performed in visit on 04/09/25   POC Urinalysis Dipstick, Multipro    Collection Time: 04/09/25  1:09 PM    Specimen: Urine   Result Value Ref Range    Color Yellow Yellow, Straw, Dark Yellow, Libby    Clarity, UA Clear Clear    Glucose, UA Negative Negative mg/dL    Bilirubin Negative Negative    Ketones, UA Negative Negative    Specific Gravity  1.020 1.005 - 1.030    Blood, UA Negative Negative    pH, Urine 7.0 5.0 - 8.0    Protein, POC Negative Negative mg/dL    Urobilinogen, UA 0.2 E.U./dL Normal, 0.2 E.U./dL    Nitrite, UA Negative Negative    Leukocytes Negative Negative     KUB independent review    A KUB is available for me to review today.  The image is inspected for a bowel gas pattern and the general bone structure of the spine and pelvis. The kidneys are then inspected closely.  Renal outline is noted if identifiable. The kidney, collecting system, and anticipated path of the ureter are examined for calcifications including those in the true pelvis.  This film reveals:    On the right there are no calcificaitons seen in the kidney or the expected course of the ureter. .    On the left there are no calcificaitons seen in the kidney or the expected course of the ureter. .  Assessment and Plan    Diagnoses and all orders for this visit:    1. " History of kidney stones (Primary)  -     POC Urinalysis Dipstick, Multipro  -     XR Abdomen KUB; Future      Some mild constipation no obvious stones are seen she has had no stone episodes since last seen.  Urine is clear with a pH of 7.0.    Continue to drink adequate good water and she will follow-up in 1 year KUB prior.

## 2025-04-08 ENCOUNTER — TELEPHONE (OUTPATIENT)
Dept: UROLOGY | Facility: CLINIC | Age: 25
End: 2025-04-08
Payer: COMMERCIAL

## 2025-04-08 NOTE — TELEPHONE ENCOUNTER
Called Patient to remind them to get KUB prior to appointment.  Left voicemail for Patient.  If patient calls back it is ok for the HUB to tell the pt the message.

## 2025-04-09 ENCOUNTER — HOSPITAL ENCOUNTER (OUTPATIENT)
Dept: GENERAL RADIOLOGY | Facility: HOSPITAL | Age: 25
Discharge: HOME | End: 2025-04-09
Admitting: PHYSICIAN ASSISTANT
Payer: COMMERCIAL

## 2025-04-09 ENCOUNTER — OFFICE VISIT (OUTPATIENT)
Dept: UROLOGY | Facility: CLINIC | Age: 25
End: 2025-04-09
Payer: COMMERCIAL

## 2025-04-09 VITALS — BODY MASS INDEX: 23.98 KG/M2 | WEIGHT: 149.2 LBS | HEIGHT: 66 IN | TEMPERATURE: 97.4 F

## 2025-04-09 DIAGNOSIS — Z87.442 HISTORY OF KIDNEY STONES: ICD-10-CM

## 2025-04-09 DIAGNOSIS — Z87.442 HISTORY OF KIDNEY STONES: Primary | ICD-10-CM

## 2025-04-09 PROCEDURE — 74018 RADEX ABDOMEN 1 VIEW: CPT

## 2025-07-09 ENCOUNTER — OFFICE VISIT (OUTPATIENT)
Dept: FAMILY MEDICINE CLINIC | Facility: CLINIC | Age: 25
End: 2025-07-09
Payer: COMMERCIAL

## 2025-07-09 VITALS
OXYGEN SATURATION: 98 % | BODY MASS INDEX: 25.07 KG/M2 | SYSTOLIC BLOOD PRESSURE: 110 MMHG | HEIGHT: 66 IN | WEIGHT: 156 LBS | DIASTOLIC BLOOD PRESSURE: 70 MMHG | HEART RATE: 85 BPM | TEMPERATURE: 97.3 F

## 2025-07-09 DIAGNOSIS — R31.9 HEMATURIA, UNSPECIFIED TYPE: Primary | ICD-10-CM

## 2025-07-09 LAB
BILIRUB BLD-MCNC: NEGATIVE MG/DL
CLARITY, POC: ABNORMAL
COLOR UR: YELLOW
GLUCOSE UR STRIP-MCNC: NEGATIVE MG/DL
KETONES UR QL: NEGATIVE
LEUKOCYTE EST, POC: ABNORMAL
NITRITE UR-MCNC: NEGATIVE MG/ML
PH UR: 6 [PH] (ref 5–8)
PROT UR STRIP-MCNC: ABNORMAL MG/DL
RBC # UR STRIP: ABNORMAL /UL
SP GR UR: 1.03 (ref 1–1.03)
UROBILINOGEN UR QL: ABNORMAL

## 2025-07-09 PROCEDURE — 99213 OFFICE O/P EST LOW 20 MIN: CPT | Performed by: FAMILY MEDICINE

## 2025-07-09 PROCEDURE — 81003 URINALYSIS AUTO W/O SCOPE: CPT | Performed by: FAMILY MEDICINE

## 2025-07-09 RX ORDER — NITROFURANTOIN 25; 75 MG/1; MG/1
100 CAPSULE ORAL 2 TIMES DAILY
Qty: 10 CAPSULE | Refills: 0 | Status: SHIPPED | OUTPATIENT
Start: 2025-07-09 | End: 2025-07-14

## 2025-07-16 NOTE — PROGRESS NOTES
"CC:   Chief Complaint   Patient presents with    Urinary Tract Infection       History:  Yuly Fernandes is a 25 y.o. female who presents today for follow-up for evaluation of the above:    History of Present Illness  Patient has had a few days this week of hematuria, no fever or chills  UA with cloudy appearance, 3+ blood and protein with small 1+ leukocytes       Ms. Fernandes  reports that she has never smoked. She has never been exposed to tobacco smoke. She has never used smokeless tobacco. She reports current alcohol use of about 1.0 standard drink of alcohol per week. She reports that she does not use drugs.      Current Outpatient Medications:     norgestimate-ethinyl estradiol (Sprintec 28) 0.25-35 MG-MCG per tablet, Take 1 tablet by mouth Daily., Disp: 28 tablet, Rfl: 7      OBJECTIVE:  /70   Pulse 85   Temp 97.3 °F (36.3 °C)   Ht 167.6 cm (65.98\")   Wt 70.8 kg (156 lb)   SpO2 98%   BMI 25.19 kg/m²    Physical Exam  Vitals and nursing note reviewed.   Constitutional:       General: She is not in acute distress.     Appearance: She is not diaphoretic.   HENT:      Head: Normocephalic and atraumatic.      Nose: Nose normal.   Eyes:      General: No scleral icterus.        Right eye: No discharge.         Left eye: No discharge.      Conjunctiva/sclera: Conjunctivae normal.   Neck:      Trachea: No tracheal deviation.   Pulmonary:      Effort: Pulmonary effort is normal.   Skin:     General: Skin is warm and dry.      Coloration: Skin is not pale.   Neurological:      Mental Status: She is alert and oriented to person, place, and time.   Psychiatric:         Behavior: Behavior normal.         Thought Content: Thought content normal.         Judgment: Judgment normal.         Assessment/Plan     Diagnosis Plan   1. Hematuria, unspecified type  POCT urinalysis dipstick, multipro    nitrofurantoin, macrocrystal-monohydrate, (Macrobid) 100 MG capsule        Assessment & Plan  Suspect " underlying UTI, Macrobid above but recommend follow-up if hematuria is persistent for further investigation       An After Visit Summary was printed and given to the patient at discharge.  Return if symptoms worsen or fail to improve. Sooner if problems arise.         Felipe Medrano D.O.  Family Medicine  [facility]